# Patient Record
Sex: FEMALE | URBAN - METROPOLITAN AREA
[De-identification: names, ages, dates, MRNs, and addresses within clinical notes are randomized per-mention and may not be internally consistent; named-entity substitution may affect disease eponyms.]

---

## 2017-12-06 PROBLEM — L20.84 INTRINSIC ECZEMA: Status: ACTIVE | Noted: 2017-12-06

## 2018-03-01 PROBLEM — S76.219A GROIN STRAIN: Status: ACTIVE | Noted: 2018-03-01

## 2019-07-29 PROBLEM — I48.91 ATRIAL FIBRILLATION (HCC): Status: ACTIVE | Noted: 2019-07-29

## 2019-07-29 PROBLEM — Z79.01 CHRONIC ANTICOAGULATION: Status: ACTIVE | Noted: 2019-07-29

## 2020-08-19 PROBLEM — F41.9 INSOMNIA SECONDARY TO ANXIETY: Status: ACTIVE | Noted: 2020-08-19

## 2020-08-19 PROBLEM — F51.05 INSOMNIA SECONDARY TO ANXIETY: Status: ACTIVE | Noted: 2020-08-19

## 2020-08-24 ENCOUNTER — APPOINTMENT (RX ONLY)
Dept: URBAN - METROPOLITAN AREA CLINIC 31 | Facility: CLINIC | Age: 80
Setting detail: DERMATOLOGY
End: 2020-08-24

## 2020-08-24 DIAGNOSIS — L90.5 SCAR CONDITIONS AND FIBROSIS OF SKIN: ICD-10-CM

## 2020-08-24 DIAGNOSIS — D18.0 HEMANGIOMA: ICD-10-CM

## 2020-08-24 DIAGNOSIS — L57.0 ACTINIC KERATOSIS: ICD-10-CM

## 2020-08-24 DIAGNOSIS — D22 MELANOCYTIC NEVI: ICD-10-CM

## 2020-08-24 DIAGNOSIS — L82.1 OTHER SEBORRHEIC KERATOSIS: ICD-10-CM

## 2020-08-24 DIAGNOSIS — L81.4 OTHER MELANIN HYPERPIGMENTATION: ICD-10-CM

## 2020-08-24 PROBLEM — D18.01 HEMANGIOMA OF SKIN AND SUBCUTANEOUS TISSUE: Status: ACTIVE | Noted: 2020-08-24

## 2020-08-24 PROBLEM — D22.5 MELANOCYTIC NEVI OF TRUNK: Status: ACTIVE | Noted: 2020-08-24

## 2020-08-24 PROCEDURE — ? COUNSELING

## 2020-08-24 PROCEDURE — 17000 DESTRUCT PREMALG LESION: CPT

## 2020-08-24 PROCEDURE — 99203 OFFICE O/P NEW LOW 30 MIN: CPT | Mod: 25

## 2020-08-24 PROCEDURE — ? LIQUID NITROGEN

## 2020-08-24 PROCEDURE — 17003 DESTRUCT PREMALG LES 2-14: CPT

## 2020-08-24 ASSESSMENT — LOCATION ZONE DERM
LOCATION ZONE: FACE
LOCATION ZONE: EAR
LOCATION ZONE: TRUNK

## 2020-08-24 ASSESSMENT — LOCATION SIMPLE DESCRIPTION DERM
LOCATION SIMPLE: LEFT TEMPLE
LOCATION SIMPLE: LEFT EYEBROW
LOCATION SIMPLE: LEFT EAR
LOCATION SIMPLE: RIGHT EAR
LOCATION SIMPLE: RIGHT UPPER BACK

## 2020-08-24 ASSESSMENT — LOCATION DETAILED DESCRIPTION DERM
LOCATION DETAILED: RIGHT ANTERIOR EARLOBE
LOCATION DETAILED: LEFT MID TEMPLE
LOCATION DETAILED: RIGHT SUPERIOR UPPER BACK
LOCATION DETAILED: RIGHT MID-UPPER BACK
LOCATION DETAILED: RIGHT INFERIOR UPPER BACK
LOCATION DETAILED: LEFT ANTERIOR EARLOBE
LOCATION DETAILED: LEFT LATERAL EYEBROW

## 2020-08-24 NOTE — PROCEDURE: COUNSELING
Detail Level: Generalized
Detail Level: Detailed
Patient Specific Counseling (Will Not Stick From Patient To Patient): The patient is not bothered by this, and prefers no treatment.

## 2021-03-16 PROBLEM — M54.9 UPPER BACK PAIN ON LEFT SIDE: Status: ACTIVE | Noted: 2021-03-16

## 2021-08-30 ENCOUNTER — APPOINTMENT (RX ONLY)
Dept: URBAN - METROPOLITAN AREA CLINIC 31 | Facility: CLINIC | Age: 81
Setting detail: DERMATOLOGY
End: 2021-08-30

## 2021-08-30 VITALS — HEIGHT: 60.25 IN | WEIGHT: 112 LBS

## 2021-08-30 DIAGNOSIS — L21.8 OTHER SEBORRHEIC DERMATITIS: ICD-10-CM | Status: INADEQUATELY CONTROLLED

## 2021-08-30 DIAGNOSIS — L57.0 ACTINIC KERATOSIS: ICD-10-CM

## 2021-08-30 PROCEDURE — 17000 DESTRUCT PREMALG LESION: CPT

## 2021-08-30 PROCEDURE — ? LIQUID NITROGEN

## 2021-08-30 PROCEDURE — ? PRESCRIPTION

## 2021-08-30 PROCEDURE — ? COUNSELING

## 2021-08-30 PROCEDURE — 99214 OFFICE O/P EST MOD 30 MIN: CPT | Mod: 25

## 2021-08-30 PROCEDURE — ? ADDITIONAL NOTES

## 2021-08-30 RX ORDER — KETOCONAZOLE 20 MG/ML
1 SHAMPOO, SUSPENSION TOPICAL TIW
Qty: 1 | Refills: 6 | Status: ERX | COMMUNITY
Start: 2021-08-30

## 2021-08-30 RX ADMIN — KETOCONAZOLE 1: 20 SHAMPOO, SUSPENSION TOPICAL at 00:00

## 2021-08-30 ASSESSMENT — LOCATION SIMPLE DESCRIPTION DERM
LOCATION SIMPLE: LEFT EYEBROW
LOCATION SIMPLE: RIGHT EYEBROW
LOCATION SIMPLE: ANTERIOR SCALP
LOCATION SIMPLE: RIGHT FOREHEAD

## 2021-08-30 ASSESSMENT — LOCATION DETAILED DESCRIPTION DERM
LOCATION DETAILED: MID-FRONTAL SCALP
LOCATION DETAILED: LEFT CENTRAL EYEBROW
LOCATION DETAILED: RIGHT FOREHEAD
LOCATION DETAILED: RIGHT CENTRAL EYEBROW

## 2021-08-30 ASSESSMENT — LOCATION ZONE DERM
LOCATION ZONE: SCALP
LOCATION ZONE: FACE

## 2021-08-30 NOTE — PROCEDURE: LIQUID NITROGEN
Detail Level: Detailed
Show Applicator Variable?: Yes
Consent: The patient's consent was obtained including but not limited to risks of crusting, scabbing, blistering, scarring, darker or lighter pigmentary change, recurrence, incomplete removal and infection.
Duration Of Freeze Thaw-Cycle (Seconds): 0
Post-Care Instructions: I reviewed with the patient in detail post-care instructions. Patient is to wear sunprotection, and avoid picking at any of the treated lesions. Pt may apply Vaseline to crusted or scabbing areas.
Render Post-Care Instructions In Note?: no

## 2021-08-30 NOTE — PROCEDURE: ADDITIONAL NOTES
Additional Notes: Discussed in detail diagnosis and treatments such as LN2. \\nWill treat with LN2 today.  \\nDiscussed treatment and risks with patient.\\nIncludes concern on intake.
Detail Level: Detailed
Render Risk Assessment In Note?: no
Additional Notes: Discussed in detail diagnosis and treatments such as topical steroids or topical anti-fungals. \\nWill prescribe Ketoconazole shampoo and send Rx to pharmacy.  \\nExplained in detail how to apply topical anti-fungal shampoo three times a week. \\nDiscussed treatment and risks with patient.

## 2021-10-05 ENCOUNTER — APPOINTMENT (RX ONLY)
Dept: URBAN - METROPOLITAN AREA CLINIC 31 | Facility: CLINIC | Age: 81
Setting detail: DERMATOLOGY
End: 2021-10-05

## 2021-10-05 DIAGNOSIS — L81.4 OTHER MELANIN HYPERPIGMENTATION: ICD-10-CM

## 2021-10-05 DIAGNOSIS — Z71.89 OTHER SPECIFIED COUNSELING: ICD-10-CM

## 2021-10-05 DIAGNOSIS — D18.0 HEMANGIOMA: ICD-10-CM

## 2021-10-05 DIAGNOSIS — L72.0 EPIDERMAL CYST: ICD-10-CM

## 2021-10-05 DIAGNOSIS — D22 MELANOCYTIC NEVI: ICD-10-CM

## 2021-10-05 DIAGNOSIS — L82.1 OTHER SEBORRHEIC KERATOSIS: ICD-10-CM

## 2021-10-05 DIAGNOSIS — Z00.00 ENCOUNTER FOR GENERAL ADULT MEDICAL EXAMINATION WITHOUT ABNORMAL FINDINGS: ICD-10-CM

## 2021-10-05 DIAGNOSIS — L82.0 INFLAMED SEBORRHEIC KERATOSIS: ICD-10-CM

## 2021-10-05 PROBLEM — Z71.1 PERSON WITH FEARED HEALTH COMPLAINT IN WHOM NO DIAGNOSIS IS MADE: Status: ACTIVE | Noted: 2021-10-05

## 2021-10-05 PROBLEM — D18.01 HEMANGIOMA OF SKIN AND SUBCUTANEOUS TISSUE: Status: ACTIVE | Noted: 2021-10-05

## 2021-10-05 PROBLEM — D22.5 MELANOCYTIC NEVI OF TRUNK: Status: ACTIVE | Noted: 2021-10-05

## 2021-10-05 PROCEDURE — 99213 OFFICE O/P EST LOW 20 MIN: CPT | Mod: 25

## 2021-10-05 PROCEDURE — ? LIQUID NITROGEN

## 2021-10-05 PROCEDURE — ? COUNSELING

## 2021-10-05 PROCEDURE — 17110 DESTRUCTION B9 LES UP TO 14: CPT

## 2021-10-05 ASSESSMENT — LOCATION ZONE DERM
LOCATION ZONE: TRUNK
LOCATION ZONE: FACE
LOCATION ZONE: LEG

## 2021-10-05 ASSESSMENT — LOCATION DETAILED DESCRIPTION DERM
LOCATION DETAILED: LEFT INFERIOR CENTRAL MALAR CHEEK
LOCATION DETAILED: LEFT MEDIAL MALAR CHEEK
LOCATION DETAILED: RIGHT MID-UPPER BACK
LOCATION DETAILED: RIGHT INFERIOR UPPER BACK
LOCATION DETAILED: RIGHT FOREHEAD
LOCATION DETAILED: RIGHT SUPERIOR UPPER BACK
LOCATION DETAILED: LEFT POPLITEAL SKIN

## 2021-10-05 ASSESSMENT — LOCATION SIMPLE DESCRIPTION DERM
LOCATION SIMPLE: LEFT POPLITEAL SKIN
LOCATION SIMPLE: LEFT CHEEK
LOCATION SIMPLE: RIGHT UPPER BACK
LOCATION SIMPLE: RIGHT FOREHEAD

## 2021-10-05 NOTE — PROCEDURE: LIQUID NITROGEN
Medical Necessity Information: It is in your best interest to select a reason for this procedure from the list below. All of these items fulfill various CMS LCD requirements except the new and changing color options.
Add 52 Modifier (Optional): no
Consent: The patient's consent was obtained including but not limited to risks of crusting, scabbing, blistering, scarring, darker or lighter pigmentary change, recurrence, incomplete removal and infection.
Detail Level: Detailed
Show Aperture Variable?: Yes
Medical Necessity Clause: This procedure was medically necessary because the lesions that were treated were:
Post-Care Instructions: I reviewed with the patient in detail post-care instructions. Patient is to wear sunprotection, and avoid picking at any of the treated lesions. Pt may apply Vaseline to crusted or scabbing areas.

## 2022-06-21 ENCOUNTER — APPOINTMENT (RX ONLY)
Dept: URBAN - METROPOLITAN AREA CLINIC 31 | Facility: CLINIC | Age: 82
Setting detail: DERMATOLOGY
End: 2022-06-21

## 2022-06-21 DIAGNOSIS — L82.1 OTHER SEBORRHEIC KERATOSIS: ICD-10-CM

## 2022-06-21 PROCEDURE — 99212 OFFICE O/P EST SF 10 MIN: CPT

## 2022-06-21 PROCEDURE — ? COUNSELING

## 2022-06-21 PROCEDURE — ? ADDITIONAL NOTES

## 2022-06-21 ASSESSMENT — LOCATION ZONE DERM: LOCATION ZONE: FACE

## 2022-06-21 ASSESSMENT — LOCATION DETAILED DESCRIPTION DERM: LOCATION DETAILED: LEFT SUPERIOR LATERAL BUCCAL CHEEK

## 2022-06-21 ASSESSMENT — LOCATION SIMPLE DESCRIPTION DERM: LOCATION SIMPLE: LEFT CHEEK

## 2022-06-21 NOTE — PROCEDURE: ADDITIONAL NOTES
Additional Notes: Includes lesion of concern noted on intake
Detail Level: Zone
Render Risk Assessment In Note?: no

## 2022-10-06 ENCOUNTER — APPOINTMENT (RX ONLY)
Dept: URBAN - METROPOLITAN AREA CLINIC 31 | Facility: CLINIC | Age: 82
Setting detail: DERMATOLOGY
End: 2022-10-06

## 2022-10-06 ENCOUNTER — RX ONLY (OUTPATIENT)
Age: 82
Setting detail: RX ONLY
End: 2022-10-06

## 2022-10-06 DIAGNOSIS — R20.2 PARESTHESIA OF SKIN: ICD-10-CM

## 2022-10-06 DIAGNOSIS — L21.8 OTHER SEBORRHEIC DERMATITIS: ICD-10-CM

## 2022-10-06 DIAGNOSIS — Z71.89 OTHER SPECIFIED COUNSELING: ICD-10-CM

## 2022-10-06 DIAGNOSIS — D22 MELANOCYTIC NEVI: ICD-10-CM

## 2022-10-06 DIAGNOSIS — L81.4 OTHER MELANIN HYPERPIGMENTATION: ICD-10-CM

## 2022-10-06 DIAGNOSIS — L82.1 OTHER SEBORRHEIC KERATOSIS: ICD-10-CM

## 2022-10-06 DIAGNOSIS — D18.0 HEMANGIOMA: ICD-10-CM

## 2022-10-06 PROBLEM — D22.5 MELANOCYTIC NEVI OF TRUNK: Status: ACTIVE | Noted: 2022-10-06

## 2022-10-06 PROBLEM — D18.01 HEMANGIOMA OF SKIN AND SUBCUTANEOUS TISSUE: Status: ACTIVE | Noted: 2022-10-06

## 2022-10-06 PROCEDURE — ? COUNSELING

## 2022-10-06 PROCEDURE — 99213 OFFICE O/P EST LOW 20 MIN: CPT

## 2022-10-06 PROCEDURE — ? PRESCRIPTION

## 2022-10-06 RX ORDER — HYDROCORTISONE VALERATE 2 MG/G
CREAM TOPICAL
Qty: 15 | Refills: 6 | Status: ERX | COMMUNITY
Start: 2022-10-06

## 2022-10-06 RX ORDER — FLUOCINOLONE ACETONIDE 0.25 MG/G
CREAM TOPICAL
Qty: 60 | Refills: 11 | Status: ERX | COMMUNITY
Start: 2022-10-06

## 2022-10-06 RX ADMIN — HYDROCORTISONE VALERATE: 2 CREAM TOPICAL at 00:00

## 2022-10-06 ASSESSMENT — LOCATION SIMPLE DESCRIPTION DERM
LOCATION SIMPLE: RIGHT UPPER BACK
LOCATION SIMPLE: RIGHT EYEBROW
LOCATION SIMPLE: LEFT UPPER BACK
LOCATION SIMPLE: LEFT EYEBROW

## 2022-10-06 ASSESSMENT — LOCATION DETAILED DESCRIPTION DERM
LOCATION DETAILED: RIGHT INFERIOR UPPER BACK
LOCATION DETAILED: RIGHT CENTRAL EYEBROW
LOCATION DETAILED: LEFT SUPERIOR UPPER BACK
LOCATION DETAILED: RIGHT SUPERIOR UPPER BACK
LOCATION DETAILED: LEFT CENTRAL EYEBROW

## 2022-10-06 ASSESSMENT — LOCATION ZONE DERM
LOCATION ZONE: TRUNK
LOCATION ZONE: FACE

## 2023-10-11 ENCOUNTER — APPOINTMENT (RX ONLY)
Dept: URBAN - METROPOLITAN AREA CLINIC 31 | Facility: CLINIC | Age: 83
Setting detail: DERMATOLOGY
End: 2023-10-11

## 2023-10-11 DIAGNOSIS — Z71.89 OTHER SPECIFIED COUNSELING: ICD-10-CM

## 2023-10-11 DIAGNOSIS — D22 MELANOCYTIC NEVI: ICD-10-CM

## 2023-10-11 DIAGNOSIS — L82.1 OTHER SEBORRHEIC KERATOSIS: ICD-10-CM

## 2023-10-11 DIAGNOSIS — R233 SPONTANEOUS ECCHYMOSES: ICD-10-CM

## 2023-10-11 DIAGNOSIS — L21.8 OTHER SEBORRHEIC DERMATITIS: ICD-10-CM

## 2023-10-11 DIAGNOSIS — T07XXXA INSECT BITE, NONVENOMOUS, OF OTHER, MULTIPLE, AND UNSPECIFIED SITES, WITHOUT MENTION OF INFECTION: ICD-10-CM

## 2023-10-11 DIAGNOSIS — L81.4 OTHER MELANIN HYPERPIGMENTATION: ICD-10-CM

## 2023-10-11 DIAGNOSIS — D18.0 HEMANGIOMA: ICD-10-CM

## 2023-10-11 PROBLEM — S90.211A CONTUSION OF RIGHT GREAT TOE WITH DAMAGE TO NAIL, INITIAL ENCOUNTER: Status: ACTIVE | Noted: 2023-10-11

## 2023-10-11 PROBLEM — S00.86XA INSECT BITE (NONVENOMOUS) OF OTHER PART OF HEAD, INITIAL ENCOUNTER: Status: ACTIVE | Noted: 2023-10-11

## 2023-10-11 PROBLEM — D18.01 HEMANGIOMA OF SKIN AND SUBCUTANEOUS TISSUE: Status: ACTIVE | Noted: 2023-10-11

## 2023-10-11 PROBLEM — D22.5 MELANOCYTIC NEVI OF TRUNK: Status: ACTIVE | Noted: 2023-10-11

## 2023-10-11 PROCEDURE — ? COUNSELING

## 2023-10-11 PROCEDURE — ? PRESCRIPTION

## 2023-10-11 PROCEDURE — 99213 OFFICE O/P EST LOW 20 MIN: CPT

## 2023-10-11 RX ORDER — HYDROCORTISONE VALERATE 2 MG/G
CREAM TOPICAL
Qty: 45 | Refills: 6 | Status: ERX | COMMUNITY
Start: 2023-10-11

## 2023-10-11 RX ADMIN — HYDROCORTISONE VALERATE: 2 CREAM TOPICAL at 00:00

## 2023-10-11 ASSESSMENT — LOCATION ZONE DERM
LOCATION ZONE: TOENAIL
LOCATION ZONE: FACE
LOCATION ZONE: TRUNK

## 2023-10-11 ASSESSMENT — LOCATION SIMPLE DESCRIPTION DERM
LOCATION SIMPLE: LEFT FOREHEAD
LOCATION SIMPLE: RIGHT UPPER BACK
LOCATION SIMPLE: RIGHT GREAT TOE

## 2023-10-11 ASSESSMENT — LOCATION DETAILED DESCRIPTION DERM
LOCATION DETAILED: LEFT SUPERIOR LATERAL FOREHEAD
LOCATION DETAILED: RIGHT SUPERIOR UPPER BACK
LOCATION DETAILED: RIGHT INFERIOR UPPER BACK
LOCATION DETAILED: RIGHT GREAT TOENAIL

## 2023-11-01 PROBLEM — I10 ESSENTIAL HYPERTENSION, BENIGN: Status: ACTIVE | Noted: 2023-11-01

## 2024-02-14 ENCOUNTER — HOSPITAL ENCOUNTER (OUTPATIENT)
Facility: MEDICAL CENTER | Age: 84
End: 2024-02-16
Attending: STUDENT IN AN ORGANIZED HEALTH CARE EDUCATION/TRAINING PROGRAM | Admitting: STUDENT IN AN ORGANIZED HEALTH CARE EDUCATION/TRAINING PROGRAM
Payer: MEDICARE

## 2024-02-14 DIAGNOSIS — K64.8 INTERNAL HEMORRHOIDS: ICD-10-CM

## 2024-02-14 PROBLEM — K92.2 GIB (GASTROINTESTINAL BLEEDING): Status: ACTIVE | Noted: 2024-02-14

## 2024-02-14 PROBLEM — I48.0 PAROXYSMAL ATRIAL FIBRILLATION (HCC): Status: ACTIVE | Noted: 2019-07-29

## 2024-02-14 PROBLEM — K62.5 BRIGHT RED BLOOD PER RECTUM: Status: ACTIVE | Noted: 2024-02-14

## 2024-02-14 PROCEDURE — 99223 1ST HOSP IP/OBS HIGH 75: CPT | Performed by: STUDENT IN AN ORGANIZED HEALTH CARE EDUCATION/TRAINING PROGRAM

## 2024-02-14 PROCEDURE — G0378 HOSPITAL OBSERVATION PER HR: HCPCS

## 2024-02-14 RX ORDER — METOPROLOL SUCCINATE 25 MG/1
25 TABLET, EXTENDED RELEASE ORAL 2 TIMES DAILY
Status: DISCONTINUED | OUTPATIENT
Start: 2024-02-14 | End: 2024-02-16 | Stop reason: HOSPADM

## 2024-02-14 RX ORDER — OMEPRAZOLE 20 MG/1
20 CAPSULE, DELAYED RELEASE ORAL DAILY
Status: DISCONTINUED | OUTPATIENT
Start: 2024-02-15 | End: 2024-02-15

## 2024-02-14 RX ORDER — ATORVASTATIN CALCIUM 10 MG/1
10 TABLET, FILM COATED ORAL DAILY
Status: DISCONTINUED | OUTPATIENT
Start: 2024-02-15 | End: 2024-02-16 | Stop reason: HOSPADM

## 2024-02-14 RX ORDER — LABETALOL HYDROCHLORIDE 5 MG/ML
10 INJECTION, SOLUTION INTRAVENOUS EVERY 4 HOURS PRN
Status: DISCONTINUED | OUTPATIENT
Start: 2024-02-14 | End: 2024-02-16 | Stop reason: HOSPADM

## 2024-02-14 RX ORDER — LEVOTHYROXINE SODIUM 0.07 MG/1
75 TABLET ORAL
Status: DISCONTINUED | OUTPATIENT
Start: 2024-02-15 | End: 2024-02-16 | Stop reason: HOSPADM

## 2024-02-14 RX ORDER — SODIUM CHLORIDE 9 MG/ML
INJECTION, SOLUTION INTRAVENOUS CONTINUOUS
Status: DISCONTINUED | OUTPATIENT
Start: 2024-02-14 | End: 2024-02-16 | Stop reason: HOSPADM

## 2024-02-14 RX ORDER — LOSARTAN POTASSIUM 50 MG/1
25 TABLET ORAL 2 TIMES DAILY
Status: DISCONTINUED | OUTPATIENT
Start: 2024-02-14 | End: 2024-02-16 | Stop reason: HOSPADM

## 2024-02-14 RX ORDER — PANTOPRAZOLE SODIUM 20 MG/1
40 TABLET, DELAYED RELEASE ORAL DAILY
Status: DISCONTINUED | OUTPATIENT
Start: 2024-02-15 | End: 2024-02-14

## 2024-02-14 RX ORDER — ACETAMINOPHEN 325 MG/1
650 TABLET ORAL EVERY 6 HOURS PRN
Status: DISCONTINUED | OUTPATIENT
Start: 2024-02-14 | End: 2024-02-16 | Stop reason: HOSPADM

## 2024-02-14 ASSESSMENT — ENCOUNTER SYMPTOMS
COUGH: 0
FEVER: 0
CHILLS: 0
ABDOMINAL PAIN: 0
DIARRHEA: 0
BLOOD IN STOOL: 1
VOMITING: 0
SHORTNESS OF BREATH: 0
NAUSEA: 0

## 2024-02-14 ASSESSMENT — LIFESTYLE VARIABLES
EVER FELT BAD OR GUILTY ABOUT YOUR DRINKING: NO
ON A TYPICAL DAY WHEN YOU DRINK ALCOHOL HOW MANY DRINKS DO YOU HAVE: 0
ALCOHOL_USE: NO
AVERAGE NUMBER OF DAYS PER WEEK YOU HAVE A DRINK CONTAINING ALCOHOL: 0
HAVE PEOPLE ANNOYED YOU BY CRITICIZING YOUR DRINKING: NO
EVER HAD A DRINK FIRST THING IN THE MORNING TO STEADY YOUR NERVES TO GET RID OF A HANGOVER: NO
HAVE YOU EVER FELT YOU SHOULD CUT DOWN ON YOUR DRINKING: NO
DOES PATIENT WANT TO STOP DRINKING: NO
HOW MANY TIMES IN THE PAST YEAR HAVE YOU HAD 5 OR MORE DRINKS IN A DAY: 0
CONSUMPTION TOTAL: NEGATIVE
TOTAL SCORE: 0

## 2024-02-14 ASSESSMENT — FIBROSIS 4 INDEX
FIB4 SCORE: 1.35
FIB4 SCORE: 1.35

## 2024-02-14 ASSESSMENT — PATIENT HEALTH QUESTIONNAIRE - PHQ9
1. LITTLE INTEREST OR PLEASURE IN DOING THINGS: NOT AT ALL
SUM OF ALL RESPONSES TO PHQ9 QUESTIONS 1 AND 2: 0
2. FEELING DOWN, DEPRESSED, IRRITABLE, OR HOPELESS: NOT AT ALL

## 2024-02-15 ENCOUNTER — ANESTHESIA (OUTPATIENT)
Dept: SURGERY | Facility: MEDICAL CENTER | Age: 84
End: 2024-02-15
Payer: MEDICARE

## 2024-02-15 ENCOUNTER — ANESTHESIA EVENT (OUTPATIENT)
Dept: SURGERY | Facility: MEDICAL CENTER | Age: 84
End: 2024-02-15
Payer: MEDICARE

## 2024-02-15 PROBLEM — D68.32 HEMORRHAGIC DISORDER DUE TO EXTRINSIC CIRCULATING ANTICOAGULANTS (HCC): Status: ACTIVE | Noted: 2024-02-15

## 2024-02-15 LAB
ANION GAP SERPL CALC-SCNC: 12 MMOL/L (ref 7–16)
BUN SERPL-MCNC: 14 MG/DL (ref 8–22)
CALCIUM SERPL-MCNC: 8.6 MG/DL (ref 8.5–10.5)
CHLORIDE SERPL-SCNC: 108 MMOL/L (ref 96–112)
CO2 SERPL-SCNC: 22 MMOL/L (ref 20–33)
CREAT SERPL-MCNC: 0.86 MG/DL (ref 0.5–1.4)
ERYTHROCYTE [DISTWIDTH] IN BLOOD BY AUTOMATED COUNT: 41.6 FL (ref 35.9–50)
GFR SERPLBLD CREATININE-BSD FMLA CKD-EPI: 68 ML/MIN/1.73 M 2
GLUCOSE SERPL-MCNC: 95 MG/DL (ref 65–99)
HCT VFR BLD AUTO: 32 % (ref 37–47)
HGB BLD-MCNC: 11.8 G/DL (ref 12–16)
MCH RBC QN AUTO: 33.8 PG (ref 27–33)
MCHC RBC AUTO-ENTMCNC: 36.9 G/DL (ref 32.2–35.5)
MCV RBC AUTO: 91.7 FL (ref 81.4–97.8)
PLATELET # BLD AUTO: 239 K/UL (ref 164–446)
PMV BLD AUTO: 9 FL (ref 9–12.9)
POTASSIUM SERPL-SCNC: 3.5 MMOL/L (ref 3.6–5.5)
RBC # BLD AUTO: 3.49 M/UL (ref 4.2–5.4)
SODIUM SERPL-SCNC: 142 MMOL/L (ref 135–145)
WBC # BLD AUTO: 9.3 K/UL (ref 4.8–10.8)

## 2024-02-15 PROCEDURE — 99233 SBSQ HOSP IP/OBS HIGH 50: CPT | Mod: FS | Performed by: INTERNAL MEDICINE

## 2024-02-15 PROCEDURE — 160202 HCHG ENDO MINUTES - 1ST 30 MINS LEVEL 3: Performed by: INTERNAL MEDICINE

## 2024-02-15 PROCEDURE — 96376 TX/PRO/DX INJ SAME DRUG ADON: CPT | Mod: XU

## 2024-02-15 PROCEDURE — 700102 HCHG RX REV CODE 250 W/ 637 OVERRIDE(OP): Performed by: STUDENT IN AN ORGANIZED HEALTH CARE EDUCATION/TRAINING PROGRAM

## 2024-02-15 PROCEDURE — 700111 HCHG RX REV CODE 636 W/ 250 OVERRIDE (IP): Performed by: INTERNAL MEDICINE

## 2024-02-15 PROCEDURE — G0378 HOSPITAL OBSERVATION PER HR: HCPCS

## 2024-02-15 PROCEDURE — 700101 HCHG RX REV CODE 250: Performed by: INTERNAL MEDICINE

## 2024-02-15 PROCEDURE — 160035 HCHG PACU - 1ST 60 MINS PHASE I: Performed by: INTERNAL MEDICINE

## 2024-02-15 PROCEDURE — 160048 HCHG OR STATISTICAL LEVEL 1-5: Performed by: INTERNAL MEDICINE

## 2024-02-15 PROCEDURE — 160002 HCHG RECOVERY MINUTES (STAT): Performed by: INTERNAL MEDICINE

## 2024-02-15 PROCEDURE — 99214 OFFICE O/P EST MOD 30 MIN: CPT | Mod: 25 | Performed by: INTERNAL MEDICINE

## 2024-02-15 PROCEDURE — 700101 HCHG RX REV CODE 250: Performed by: ANESTHESIOLOGY

## 2024-02-15 PROCEDURE — 96374 THER/PROPH/DIAG INJ IV PUSH: CPT | Mod: XU

## 2024-02-15 PROCEDURE — 700105 HCHG RX REV CODE 258: Performed by: ANESTHESIOLOGY

## 2024-02-15 PROCEDURE — 160009 HCHG ANES TIME/MIN: Performed by: INTERNAL MEDICINE

## 2024-02-15 PROCEDURE — 43235 EGD DIAGNOSTIC BRUSH WASH: CPT | Performed by: INTERNAL MEDICINE

## 2024-02-15 PROCEDURE — 700111 HCHG RX REV CODE 636 W/ 250 OVERRIDE (IP): Mod: JZ | Performed by: ANESTHESIOLOGY

## 2024-02-15 PROCEDURE — 80048 BASIC METABOLIC PNL TOTAL CA: CPT

## 2024-02-15 PROCEDURE — C9113 INJ PANTOPRAZOLE SODIUM, VIA: HCPCS | Performed by: INTERNAL MEDICINE

## 2024-02-15 PROCEDURE — A9270 NON-COVERED ITEM OR SERVICE: HCPCS | Performed by: STUDENT IN AN ORGANIZED HEALTH CARE EDUCATION/TRAINING PROGRAM

## 2024-02-15 PROCEDURE — 700105 HCHG RX REV CODE 258: Performed by: STUDENT IN AN ORGANIZED HEALTH CARE EDUCATION/TRAINING PROGRAM

## 2024-02-15 PROCEDURE — 85027 COMPLETE CBC AUTOMATED: CPT

## 2024-02-15 RX ORDER — LABETALOL HYDROCHLORIDE 5 MG/ML
5 INJECTION, SOLUTION INTRAVENOUS
Status: DISCONTINUED | OUTPATIENT
Start: 2024-02-15 | End: 2024-02-15 | Stop reason: HOSPADM

## 2024-02-15 RX ORDER — SODIUM CHLORIDE, SODIUM LACTATE, POTASSIUM CHLORIDE, CALCIUM CHLORIDE 600; 310; 30; 20 MG/100ML; MG/100ML; MG/100ML; MG/100ML
INJECTION, SOLUTION INTRAVENOUS CONTINUOUS
Status: DISCONTINUED | OUTPATIENT
Start: 2024-02-15 | End: 2024-02-16 | Stop reason: HOSPADM

## 2024-02-15 RX ORDER — MEPERIDINE HYDROCHLORIDE 25 MG/ML
12.5 INJECTION INTRAMUSCULAR; INTRAVENOUS; SUBCUTANEOUS
Status: DISCONTINUED | OUTPATIENT
Start: 2024-02-15 | End: 2024-02-15 | Stop reason: HOSPADM

## 2024-02-15 RX ORDER — OXYCODONE HCL 5 MG/5 ML
10 SOLUTION, ORAL ORAL
Status: DISCONTINUED | OUTPATIENT
Start: 2024-02-15 | End: 2024-02-15 | Stop reason: HOSPADM

## 2024-02-15 RX ORDER — METOPROLOL TARTRATE 1 MG/ML
1 INJECTION, SOLUTION INTRAVENOUS
Status: DISCONTINUED | OUTPATIENT
Start: 2024-02-15 | End: 2024-02-15 | Stop reason: HOSPADM

## 2024-02-15 RX ORDER — HALOPERIDOL 5 MG/ML
1 INJECTION INTRAMUSCULAR
Status: DISCONTINUED | OUTPATIENT
Start: 2024-02-15 | End: 2024-02-15 | Stop reason: HOSPADM

## 2024-02-15 RX ORDER — IPRATROPIUM BROMIDE AND ALBUTEROL SULFATE 2.5; .5 MG/3ML; MG/3ML
3 SOLUTION RESPIRATORY (INHALATION)
Status: DISCONTINUED | OUTPATIENT
Start: 2024-02-15 | End: 2024-02-15 | Stop reason: HOSPADM

## 2024-02-15 RX ORDER — OXYCODONE HCL 5 MG/5 ML
5 SOLUTION, ORAL ORAL
Status: DISCONTINUED | OUTPATIENT
Start: 2024-02-15 | End: 2024-02-15 | Stop reason: HOSPADM

## 2024-02-15 RX ORDER — ONDANSETRON 2 MG/ML
4 INJECTION INTRAMUSCULAR; INTRAVENOUS
Status: DISCONTINUED | OUTPATIENT
Start: 2024-02-15 | End: 2024-02-15 | Stop reason: HOSPADM

## 2024-02-15 RX ORDER — LIDOCAINE HYDROCHLORIDE 20 MG/ML
INJECTION, SOLUTION EPIDURAL; INFILTRATION; INTRACAUDAL; PERINEURAL PRN
Status: DISCONTINUED | OUTPATIENT
Start: 2024-02-15 | End: 2024-02-15 | Stop reason: SURG

## 2024-02-15 RX ORDER — DIPHENHYDRAMINE HYDROCHLORIDE 50 MG/ML
12.5 INJECTION INTRAMUSCULAR; INTRAVENOUS
Status: DISCONTINUED | OUTPATIENT
Start: 2024-02-15 | End: 2024-02-15 | Stop reason: HOSPADM

## 2024-02-15 RX ORDER — SODIUM CHLORIDE, SODIUM LACTATE, POTASSIUM CHLORIDE, CALCIUM CHLORIDE 600; 310; 30; 20 MG/100ML; MG/100ML; MG/100ML; MG/100ML
INJECTION, SOLUTION INTRAVENOUS
Status: DISCONTINUED | OUTPATIENT
Start: 2024-02-15 | End: 2024-02-15 | Stop reason: SURG

## 2024-02-15 RX ORDER — PANTOPRAZOLE SODIUM 40 MG/10ML
40 INJECTION, POWDER, LYOPHILIZED, FOR SOLUTION INTRAVENOUS 2 TIMES DAILY
Status: DISCONTINUED | OUTPATIENT
Start: 2024-02-15 | End: 2024-02-16 | Stop reason: HOSPADM

## 2024-02-15 RX ORDER — HYDRALAZINE HYDROCHLORIDE 20 MG/ML
5 INJECTION INTRAMUSCULAR; INTRAVENOUS
Status: DISCONTINUED | OUTPATIENT
Start: 2024-02-15 | End: 2024-02-15 | Stop reason: HOSPADM

## 2024-02-15 RX ORDER — ONDANSETRON 2 MG/ML
INJECTION INTRAMUSCULAR; INTRAVENOUS PRN
Status: DISCONTINUED | OUTPATIENT
Start: 2024-02-15 | End: 2024-02-15 | Stop reason: SURG

## 2024-02-15 RX ORDER — EPHEDRINE SULFATE 50 MG/ML
5 INJECTION, SOLUTION INTRAVENOUS
Status: DISCONTINUED | OUTPATIENT
Start: 2024-02-15 | End: 2024-02-15 | Stop reason: HOSPADM

## 2024-02-15 RX ADMIN — LEVOTHYROXINE SODIUM 75 MCG: 0.07 TABLET ORAL at 05:30

## 2024-02-15 RX ADMIN — ATORVASTATIN CALCIUM 10 MG: 10 TABLET, FILM COATED ORAL at 00:52

## 2024-02-15 RX ADMIN — LOSARTAN POTASSIUM 25 MG: 50 TABLET, FILM COATED ORAL at 00:52

## 2024-02-15 RX ADMIN — SODIUM CHLORIDE, POTASSIUM CHLORIDE, SODIUM LACTATE AND CALCIUM CHLORIDE: 600; 310; 30; 20 INJECTION, SOLUTION INTRAVENOUS at 18:11

## 2024-02-15 RX ADMIN — ONDANSETRON 4 MG: 2 INJECTION INTRAMUSCULAR; INTRAVENOUS at 16:05

## 2024-02-15 RX ADMIN — OMEPRAZOLE 20 MG: 20 CAPSULE, DELAYED RELEASE ORAL at 05:30

## 2024-02-15 RX ADMIN — SODIUM CHLORIDE: 9 INJECTION, SOLUTION INTRAVENOUS at 00:54

## 2024-02-15 RX ADMIN — POLYETHYLENE GLYCOL-3350 AND ELECTROLYTES 4 L: 236; 6.74; 5.86; 2.97; 22.74 POWDER, FOR SOLUTION ORAL at 18:25

## 2024-02-15 RX ADMIN — PANTOPRAZOLE SODIUM 40 MG: 40 INJECTION, POWDER, FOR SOLUTION INTRAVENOUS at 07:33

## 2024-02-15 RX ADMIN — ATORVASTATIN CALCIUM 10 MG: 10 TABLET, FILM COATED ORAL at 20:00

## 2024-02-15 RX ADMIN — METOPROLOL SUCCINATE 25 MG: 25 TABLET, EXTENDED RELEASE ORAL at 00:52

## 2024-02-15 RX ADMIN — METOPROLOL SUCCINATE 25 MG: 25 TABLET, EXTENDED RELEASE ORAL at 18:04

## 2024-02-15 RX ADMIN — LOSARTAN POTASSIUM 25 MG: 50 TABLET, FILM COATED ORAL at 18:04

## 2024-02-15 RX ADMIN — LIDOCAINE HYDROCHLORIDE 50 MG: 20 INJECTION, SOLUTION EPIDURAL; INFILTRATION; INTRACAUDAL at 16:01

## 2024-02-15 RX ADMIN — PROPOFOL 100 MG: 10 INJECTION, EMULSION INTRAVENOUS at 16:01

## 2024-02-15 RX ADMIN — PANTOPRAZOLE SODIUM 40 MG: 40 INJECTION, POWDER, FOR SOLUTION INTRAVENOUS at 18:05

## 2024-02-15 RX ADMIN — SODIUM CHLORIDE, POTASSIUM CHLORIDE, SODIUM LACTATE AND CALCIUM CHLORIDE: 600; 310; 30; 20 INJECTION, SOLUTION INTRAVENOUS at 15:57

## 2024-02-15 RX ADMIN — LOSARTAN POTASSIUM 25 MG: 50 TABLET, FILM COATED ORAL at 05:31

## 2024-02-15 RX ADMIN — FENTANYL CITRATE 100 MCG: 50 INJECTION, SOLUTION INTRAMUSCULAR; INTRAVENOUS at 15:59

## 2024-02-15 ASSESSMENT — ENCOUNTER SYMPTOMS
PSYCHIATRIC NEGATIVE: 1
CHILLS: 0
WEAKNESS: 1
RESPIRATORY NEGATIVE: 1
MUSCULOSKELETAL NEGATIVE: 1
BLOOD IN STOOL: 1
CARDIOVASCULAR NEGATIVE: 1
EYES NEGATIVE: 1
FEVER: 0

## 2024-02-15 ASSESSMENT — PAIN DESCRIPTION - PAIN TYPE
TYPE: SURGICAL PAIN
TYPE: ACUTE PAIN

## 2024-02-15 ASSESSMENT — PATIENT HEALTH QUESTIONNAIRE - PHQ9
1. LITTLE INTEREST OR PLEASURE IN DOING THINGS: NOT AT ALL
2. FEELING DOWN, DEPRESSED, IRRITABLE, OR HOPELESS: NOT AT ALL
SUM OF ALL RESPONSES TO PHQ9 QUESTIONS 1 AND 2: 0

## 2024-02-15 ASSESSMENT — PAIN SCALES - GENERAL: PAIN_LEVEL: 2

## 2024-02-15 NOTE — HOSPITAL COURSE
Ms. Sabrina Peters is a 83 y.o. female with PMHx of atrial fibrillation on anticoagulation, hypertension, hypothyroidism, GERD, has had dark stools/black for the past 2 weeks, no tarry characteristics.  [place] [facility] [place].      Since yesterday she has been having red-colored stools so she went to the ED.  She is on rivaroxaban for atrial fibrillation.  She has a history GERD and takes PPI, says she still has really bad heartburn despite that.  She had an EGD done 6 or more years ago and was told was negative, last colonoscopy was 11/2022 and was told was normal.  She has not been taking any NSAIDs.  Says she drinks 2 ounces of alcohol on the weekend, however last week she drank a lot more than usual.     Prior to transfer vitals were stable, on arrival here hemodynamically stable.  Labs show hemoglobin of 13.  Patient admitted to hospital medicine for management of care.    During this admission, gastroenterology Dr. Luke was consulted.  Plan for EGD today 2/15, and possible colonoscopy depending on the findings on the upper endoscopy.

## 2024-02-15 NOTE — PROGRESS NOTES
Med rec is complete per Patient at bedside.  Family/friend/caregiver present at time of interview with permission from Patient.    Allergies reviewed.    Has patient had any outside antibiotics in the last 30 days? N    Any Anticoagulants (rivaroxaban, apixaban, edoxaban, dabigatran, warfarin, enoxaparin) taken in the last 14 days? Y  Anticoagulant name: Xarelto, Last dose: 02/13/24 @ 20:00 pm.        Pharmacy/Pharmacies Pt utilizes : [phone]

## 2024-02-15 NOTE — PROGRESS NOTES
Report received from night shift RN. Patient A&Ox4, in bed resting comfortably. VSS, respirations even and unlabored on room air, denies pain, bed in lowest position and locked, call light in reach.

## 2024-02-15 NOTE — CARE PLAN
The patient is Stable - Low risk of patient condition declining or worsening    Shift Goals  Clinical Goals: NPO for GI consult  Patient Goals: rest      Problem: Knowledge Deficit - Standard  Goal: Patient and family/care givers will demonstrate understanding of plan of care, disease process/condition, diagnostic tests and medications  2/15/2024 0255 by Bello Lepe R.N.  Outcome: Progressing  2/15/2024 0255 by Bello Lepe R.N.  Outcome: Progressing       POC reviewed with pt, opportunity for questions provided. NPO for GI consult in the morning.

## 2024-02-15 NOTE — CARE PLAN
The patient is Stable - Low risk of patient condition declining or worsening    Shift Goals  Clinical Goals: GI consult, possible EGD  Patient Goals: Rest  Family Goals: N/A    Progress made toward(s) clinical / shift goals:    Problem: Knowledge Deficit - Standard  Goal: Patient and family/care givers will demonstrate understanding of plan of care, disease process/condition, diagnostic tests and medications  Outcome: Progressing     Problem: Pain - Standard  Goal: Alleviation of pain or a reduction in pain to the patient’s comfort goal  Outcome: Progressing       Patient is not progressing towards the following goals:

## 2024-02-15 NOTE — ANESTHESIA PREPROCEDURE EVALUATION
Case: [id number] Date/Time: 02/15/24 1531    Procedure: GASTROSCOPY (Esophagus)    Anesthesia type: MAC    Pre-op diagnosis: Anemia due to GI blood loss    Location: [facility] ROOM 26 / SURGERY SAME DAY [facility]    Surgeons: Rasheed Luke M.D.            Relevant Problems   CARDIAC   (positive) Essential hypertension, benign   (positive) Paroxysmal atrial fibrillation (HCC)      GI   (positive) Gastroesophageal reflux disease without esophagitis      ENDO   (positive) Acquired hypothyroidism       Physical Exam    Airway   Mallampati: II  TM distance: >3 FB  Neck ROM: full       Cardiovascular - normal exam  Rhythm: regular  Rate: normal  (-) murmur     Dental - normal exam           Pulmonary - normal exam  Breath sounds clear to auscultation     Abdominal    Neurological - normal exam                   Anesthesia Plan    ASA 2       Plan - general       Airway plan will be mask          Induction: intravenous    Postoperative Plan: Postoperative administration of opioids is intended.    Pertinent diagnostic labs and testing reviewed    Informed Consent:    Anesthetic plan and risks discussed with patient.    Use of blood products discussed with: patient whom consented to blood products.

## 2024-02-15 NOTE — H&P
Hospital Medicine History & Physical Note    Date of Service  2/14/2024    Primary Care Physician  Karena Izaguirre D.N.P.    Code Status  Full Code    Chief Complaint  Rectal bleed      History of Presenting Illness  Sabrina Peters is a 83 y.o. female who was [place] [facility] [place] with rectal bleeding.  PMH of atrial fibrillation on anticoagulation, hypertension, hypothyroidism, GERD.  She has had dark stools/black for the past 2 weeks, no tarry characteristics.  Since yesterday she has been having red-colored stools so she went to the ED.  She is on rivaroxaban for atrial fibrillation.  She has a history GERD and takes PPI, says she still has really bad heartburn despite that.  She had an EGD done 6 or more years ago and was told was negative, last colonoscopy was 11/2022 and was told was normal.  She has not been taking any NSAIDs.  Says she drinks 2 ounces of alcohol on the weekend, however last week she drank a lot more than usual.    Prior to transfer vitals were stable, on arrival here hemodynamically stable.  Labs show hemoglobin of 13.    I discussed the plan of care with patient and bedside RN.    Review of Systems  Review of Systems   Constitutional:  Negative for chills and fever.   Respiratory:  Negative for cough and shortness of breath.    Cardiovascular:  Negative for chest pain.   Gastrointestinal:  Positive for blood in stool and melena. Negative for abdominal pain, diarrhea, nausea and vomiting.   Genitourinary:  Negative for dysuria and urgency.       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    Surgical History   has a past surgical history that includes endoscopy procedure (7/5/2016); lumpectomy (Left, 2010); lumpectomy (Left, 2016); tubal coagulation laparoscopic bilateral (1974); abdominal hysterectomy total (1989); and pr colonoscopy,diagnostic (N/A, 12/1/2022).     Family History  family history includes Heart Failure in her mother; Psychiatric Illness in her father; Scoliosis (age of onset: 90) in her mother.   Family history reviewed with patient. There is no family history that is pertinent to the chief complaint.     Social History   reports that she has never smoked. She has never used smokeless tobacco. She reports current alcohol use of about 3.0 oz of alcohol per week. She reports that she does not use drugs.    Allergies  Allergies   Allergen Reactions    Codeine Nausea       Medications  Prior to Admission Medications   Prescriptions Last Dose Informant Patient Reported? Taking?   Ascorbic Acid (VITAMIN C) 1000 MG Tab   Yes No   Sig: Take  by mouth.   Calcium-Vitamin D-Vitamin K (VIACTIV PO)   Yes No   Sig: Take  by mouth.   Multiple Vitamins-Minerals (WOMENS 50+ MULTI VITAMIN/MIN PO)   Yes No   Sig: Take  by mouth.   Omega-3 Fatty Acids (FISH OIL) 1200 MG CAPSULE DELAYED RELEASE  Patient Yes No   Sig: Take 1,200 mg by mouth every day.   acyclovir (ZOVIRAX) 5 % Ointment   No No   Sig: Please give a 90 day supply Apply q 2-4 hours while awake   alclomethasone (ACLOVATE) 0.05 % cream   No No   Sig: APPLY A THIN LAYER TO VAGINAL AREA AS NEEDED FOR ITRRITATION   alendronate (FOSAMAX) 70 MG Tab 2/11/2024  No No   Sig: TAKE 1 TABLET BY MOUTH ONE TIME PER WEEK   atorvastatin (LIPITOR) 10 MG Tab   No No   Sig: TAKE 1/2 TABLET BY MOUTH EVERY DAY   fluticasone (FLONASE) 50 MCG/ACT nasal spray   No No   Sig: INSTILL 1 SPRAY INTO AFFECTED NOSTRIL(S) EVERY DAY.   levothyroxine  (SYNTHROID) 75 MCG Tab   No No   Sig: Take one tab daily except Sunday   losartan (COZAAR) 25 MG Tab   No No   Sig: Take 1 Tablet by mouth 2 times a day.   metoprolol SR (TOPROL XL) 25 MG TABLET SR 24 HR   No No   Sig: TAKE 1 TABLET BY MOUTH TWICE A DAY   nystatin (MYCOSTATIN) 062170 UNIT/GM Ointment   No No   Sig: Apply bid to affected area   pantoprazole (PROTONIX) 20 MG tablet   No No   Sig: Take 1 Tablet by mouth every day.   rivaroxaban (XARELTO) 20 MG Tab tablet   No No   Sig: Take 1 Tablet by mouth with dinner.   valACYclovir (VALTREX) 500 MG Tab   No No   Sig: TAKE 1 TABLET BY MOUTH TWICE A DAY FOR 3 DAYS FOR RECURRENCE      Facility-Administered Medications: None       Physical Exam  Temp:  [36.4 °C (97.6 °F)-36.6 °C (97.8 °F)] 36.4 °C (97.6 °F)  Pulse:  [60-75] 67  Resp:  [13-18] 13  BP: (140-164)/(63-84) 164/72  SpO2:  [93 %-98 %] 94 %  Blood Pressure : (!) 164/72   Temperature: 36.4 °C (97.6 °F)   Pulse: 67   Respiration: 13   Pulse Oximetry: 94 %       Physical Exam  Constitutional:       Appearance: Normal appearance.   HENT:      Head: Normocephalic and atraumatic.      Mouth/Throat:      Mouth: Mucous membranes are moist.      Pharynx: No oropharyngeal exudate or posterior oropharyngeal erythema.   Cardiovascular:      Rate and Rhythm: Normal rate and regular rhythm.      Pulses: Normal pulses.      Heart sounds: Normal heart sounds. No murmur heard.  Pulmonary:      Effort: Pulmonary effort is normal. No respiratory distress.      Breath sounds: Normal breath sounds.   Musculoskeletal:         General: No swelling or tenderness. Normal range of motion.   Skin:     General: Skin is warm and dry.   Neurological:      General: No focal deficit present.      Mental Status: She is alert and oriented to person, place, and time.   Psychiatric:         Mood and Affect: Mood normal.         Laboratory:  Recent Labs     02/14/24  1813   WBC 10.0   RBC 4.13*   HEMOGLOBIN 13.4   HEMATOCRIT 38.9*   MCV 94.2  "  MCH 32.4*   MCHC 34.4   RDW 12.4   PLATELETCT 297   MPV 8.5     Recent Labs     02/14/24  1813   SODIUM 142   POTASSIUM 3.3*   CHLORIDE 105   CO2 28   GLUCOSE 83   BUN 16   CREATININE 1.0   CALCIUM 9.4     Recent Labs     02/14/24  1813   ALTSGPT 32   ASTSGOT 28   ALKPHOSPHAT 72   TBILIRUBIN 0.3   GLUCOSE 83     Recent Labs     02/14/24  1813   APTT 26.5   INR 0.99     No results for input(s): \"NTPROBNP\" in the last 72 hours.      No results for input(s): \"TROPONINT\" in the last 72 hours.    Imaging:  No orders to display     Assessment/Plan:  Justification for Admission Status  I anticipate this patient is appropriate for observation status at this time because rectal bleed    * Bright red blood per rectum- (present on admission)  Assessment & Plan  She has had dark/black stools for the past 2 weeks, since today red stools  On rivaroxaban for atrial fibrillation  Has not been taking any NSAIDs.  She has a history of GERD and is already on pantoprazole but says she still has bad symptoms despite that  She drinks 2 ounces of alcohol on the weekend but drank a lot last weekend  Hemodynamically stable, hemoglobin 13 on presentation.  CBC ordered to continue monitoring  N.p.o., GI consult in the morning    Paroxysmal atrial fibrillation (HCC)- (present on admission)  Assessment & Plan  Continue metoprolol.  Hold rivaroxaban due to GI bleed    Essential hypertension, benign- (present on admission)  Assessment & Plan  Continue losartan and metoprolol    Gastroesophageal reflux disease without esophagitis- (present on admission)  Assessment & Plan  Says she was scoped 6 years ago or maybe more and was fine.  Continue pantoprazole    Acquired hypothyroidism- (present on admission)  Assessment & Plan  Continue levothyroxine        VTE prophylaxis: pharmacologic prophylaxis contraindicated due to rectal bleed  "

## 2024-02-15 NOTE — ASSESSMENT & PLAN NOTE
Continue levothyroxine  
Continue losartan and metoprolol  
Continue metoprolol.  Hold rivaroxaban due to GI bleed  
Says she was scoped 6 years ago or maybe more and was fine.  Continue pantoprazole  
She has had dark/black stools for the past 2 weeks, since today red stools  On rivaroxaban for atrial fibrillation  Has not been taking any NSAIDs.  She has a history of GERD and is already on pantoprazole but says she still has bad symptoms despite that  She drinks 2 ounces of alcohol on the weekend but drank a lot last weekend  Hemodynamically stable, hemoglobin 13 on presentation.  CBC ordered to continue monitoring  N.p.o., GI consulted, Dr. Luke; planned EGD and possible colonoscopy  
Never smoker

## 2024-02-15 NOTE — CONSULTS
Date of Consultation:  2/15/2024    Patient: : Sabrina Peters  MRN: [id number]    Referring Physician:  Ms Oswaldo Cisneros     GI:Rasheed Luke M.D.     Reason for Consultation: Anemia due to GI blood loss    History of Present Illness:   83-year-old female who has been having reflux symptomatology and dark stool over the past several weeks.  She noticed some more juan m blood in her stool.  She presented to [facility] at the recommendation of her primary care provider.  She was transferred to [facility] for further evaluation.  Patient has never had upper endoscopy but has had an upper GI series.She is her last colonoscopy was about 1 year ago and it was normal.  Patient takes direct oral anticoagulant.  This has been on hold.      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) 2/15/2024    Essential hypertension, benign 11/1/2023    At risk for sleep apnea 11/18/2022    STOP BANG 4    Dental disorder 11/2022    DENTAL IMPLANT POST IN PLACE    Arrhythmia 2021    A FIB - ABLATION    Insomnia secondary to anxiety 08/19/2020    Groin strain 03/01/2018    Gastroesophageal reflux disease without esophagitis 05/06/2016    Allergy, unspecified not elsewhere classified     Anginal syndrome (HCC)     OCCASIONAL PAINS - PER MD, DUE TO HTN    Asthma     Diverticula of colon     Heart burn     High cholesterol     History of shingles     Hyperlipidemia     Hypertension     OSTEOPOROSIS     Recurrent UTI     Rosacea     Snoring     Thyroid disease        Past Surgical History:   Procedure Laterality Date    GA COLONOSCOPY,DIAGNOSTIC N/A 12/1/2022    Procedure: COLONOSCOPY;  Surgeon: Taina Sanford M.D.;  Location: SURGERY [facility];  Service: General    ENDOSCOPY PROCEDURE  7/5/2016    Procedure: ENDOSCOPY PROCEDURE;  Surgeon: Clarence Phillips M.D.;  Location: SURGERY [facility];  Service:     LUMPECTOMY Left 2016    LUMPECTOMY Left  2010    ABDOMINAL HYSTERECTOMY TOTAL  1989    TUBAL COAGULATION LAPAROSCOPIC BILATERAL  1974       Family History   Problem Relation Age of Onset    Scoliosis Mother 90        hip fx    Heart Failure Mother     Psychiatric Illness Father         alzhiemers       Social History     Socioeconomic History    Marital status:    Tobacco Use    Smoking status: Never    Smokeless tobacco: Never   Vaping Use    Vaping Use: Never used   Substance and Sexual Activity    Alcohol use: Yes     Alcohol/week: 3.0 oz     Types: 2 Glasses of wine, 3 Standard drinks or equivalent per week     Comment: OCCASIONALLY    Drug use: No    Sexual activity: Yes     Partners: Male     Birth control/protection: Post-Menopausal   Other Topics Concern     Service No    Blood Transfusions No    Caffeine Concern Yes    Occupational Exposure No    Hobby Hazards Yes    Sleep Concern Yes    Stress Concern No    Weight Concern No    Special Diet No    Back Care No    Exercise Yes    Bike Helmet No    Seat Belt Yes    Self-Exams Yes   Social History Narrative    Retired. . YEars of education 14. No children.     Social Determinants of Health     Financial Resource Strain: Patient Declined (4/24/2023)    Overall Financial Resource Strain (CARDIA)     Difficulty of Paying Living Expenses: Patient declined   Food Insecurity: Patient Declined (4/24/2023)    Hunger Vital Sign     Worried About Running Out of Food in the Last Year: Patient declined     Ran Out of Food in the Last Year: Patient declined   Transportation Needs: Patient Declined (4/24/2023)    PRAPARE - Transportation     Lack of Transportation (Medical): Patient declined     Lack of Transportation (Non-Medical): Patient declined   Physical Activity: Patient Declined (4/24/2023)    Exercise Vital Sign     Days of Exercise per Week: Patient declined     Minutes of Exercise per Session: Patient declined   Stress: Patient Declined (4/24/2023)    [facility]  Occupational Health - Occupational Stress Questionnaire     Feeling of Stress : Patient declined   Social Connections: Unknown (4/24/2023)    Social Connection and Isolation Panel [NHANES]     Frequency of Communication with Friends and Family: Patient declined     Frequency of Social Gatherings with Friends and Family: Patient declined     Attends Sikhism Services: Patient declined     Attends Club or Organization Meetings: Patient declined     Marital Status: Patient declined   Housing Stability: Unknown (4/24/2023)    Housing Stability Vital Sign     Unable to Pay for Housing in the Last Year: Patient refused     Unstable Housing in the Last Year: Patient refused       Current Meds (name, sig, last dose):     Current Facility-Administered Medications:     pantoprazole    NS    acetaminophen    labetalol    atorvastatin    levothyroxine    losartan    metoprolol SR      ROS  10 systems reviewed and are negative unless otherwise noted in history of present illness.    Physical Exam:  Vitals:    02/15/24 0052 02/15/24 0415 02/15/24 0529 02/15/24 0705   BP: (!) 147/66 124/59  134/62   Pulse: (!) 59 65 (!) 54    Resp:  13  14   Temp:  36.3 °C (97.3 °F)  36.7 °C (98.1 °F)   TempSrc:    Temporal   SpO2: 96% 97%     Weight:       Height:           Physical Exam  General: Non-toxic appearing, without acute distress.  HEENT: Anicteric sclera, OP with moist mucous membranes.  Neck: trachea midline, supple.  Lungs: Clear bilaterally. No labored breathing.  Heart: S1S2 Regular rate. Pulses normal.  Abd: Soft, non-tender, positive bowel sounds.   Ext: without cubbing or cyanosis or edema.  Neurologic: without focal deficit. Moves all extremities.  Psychiatric: normal speech, mood, and affect.      Labs:  Recent Labs     02/14/24  1813 02/15/24  0152   SODIUM 142 142   POTASSIUM 3.3* 3.5*   CHLORIDE 105 108   CO2 28 22   BUN 16 14   CREATININE 1.0 0.86   CALCIUM 9.4 8.6     Recent Labs     02/14/24  1813 02/15/24  0152    ALTSGPT 32  --    ASTSGOT 28  --    ALKPHOSPHAT 72  --    TBILIRUBIN 0.3  --    GLUCOSE 83 95     Recent Labs     02/14/24  1813 02/15/24  0152   WBC 10.0 9.3   ASTSGOT 28  --    ALTSGPT 32  --    ALKPHOSPHAT 72  --    TBILIRUBIN 0.3  --      Recent Labs     02/14/24  1813 02/15/24  0152   RBC 4.13* 3.49*   HEMOGLOBIN 13.4 11.8*   HEMATOCRIT 38.9* 32.0*   PLATELETCT 297 239   PROTHROMBTM 10.3  --    APTT 26.5  --    INR 0.99  --      Recent Results (from the past 24 hour(s))   CBC WITHOUT DIFFERENTIAL    Collection Time: 02/14/24  6:13 PM   Result Value Ref Range    WBC 10.0 4.8 - 10.8 K/uL    RBC 4.13 (L) 4.20 - 5.40 M/uL    Hemoglobin 13.4 13.0 - 17.0 g/dL    Hematocrit 38.9 (L) 39.0 - 50.0 %    MCV 94.2 81.0 - 99.0 fL    MCH 32.4 (H) 27.0 - 31.0 pg    MCHC 34.4 33.0 - 37.0 g/dL    RDW 12.4 11.5 - 14.5 %    Platelet Count 297 130 - 400 K/uL    MPV 8.5 7.4 - 10.4 fL   COMP METABOLIC PANEL    Collection Time: 02/14/24  6:13 PM   Result Value Ref Range    Sodium 142 136 - 145 mmol/L    Potassium 3.3 (L) 3.5 - 5.1 mmol/L    Chloride 105 98 - 107 mmol/L    Co2 28 21 - 32 mmol/L    Anion Gap 12 10 - 18 mmol/L    Glucose 83 74 - 99 mg/dL    Bun 16 7 - 18 mg/dL    Creatinine 1.0 0.6 - 1.0 mg/dL    Calcium 9.4 8.5 - 11.0 mg/dL    AST(SGOT) 28 15 - 37 U/L    ALT(SGPT) 32 12 - 78 U/L    Alkaline Phosphatase 72 46 - 116 U/L    Total Bilirubin 0.3 0.2 - 1.0 mg/dL    Albumin 3.9 3.4 - 5.0 g/dL    Total Protein 7.3 6.4 - 8.2 g/dL    A-G Ratio 1.1    PROTHROMBIN TIME    Collection Time: 02/14/24  6:13 PM   Result Value Ref Range    PT 10.3 9.3 - 11.7 sec    INR 0.99    APTT    Collection Time: 02/14/24  6:13 PM   Result Value Ref Range    APTT 26.5 22.8 - 31.5 sec   ESTIMATED GFR    Collection Time: 02/14/24  6:13 PM   Result Value Ref Range    GFR (CKD-EPI) 56 (A) >60 mL/min/1.73 m 2   Basic Metabolic Panel (BMP)    Collection Time: 02/15/24  1:52 AM   Result Value Ref Range    Sodium 142 135 - 145 mmol/L    Potassium 3.5 (L)  3.6 - 5.5 mmol/L    Chloride 108 96 - 112 mmol/L    Co2 22 20 - 33 mmol/L    Glucose 95 65 - 99 mg/dL    Bun 14 8 - 22 mg/dL    Creatinine 0.86 0.50 - 1.40 mg/dL    Calcium 8.6 8.5 - 10.5 mg/dL    Anion Gap 12.0 7.0 - 16.0   CBC without Differential    Collection Time: 02/15/24  1:52 AM   Result Value Ref Range    WBC 9.3 4.8 - 10.8 K/uL    RBC 3.49 (L) 4.20 - 5.40 M/uL    Hemoglobin 11.8 (L) 12.0 - 16.0 g/dL    Hematocrit 32.0 (L) 37.0 - 47.0 %    MCV 91.7 81.4 - 97.8 fL    MCH 33.8 (H) 27.0 - 33.0 pg    MCHC 36.9 (H) 32.2 - 35.5 g/dL    RDW 41.6 35.9 - 50.0 fL    Platelet Count 239 164 - 446 K/uL    MPV 9.0 9.0 - 12.9 fL   ESTIMATED GFR    Collection Time: 02/15/24  1:52 AM   Result Value Ref Range    GFR (CKD-EPI) 68 >60 mL/min/1.73 m 2         MDM Data Review:  -Records reviewed and summarized in current documentation  -I personally reviewed and interpreted the laboratory results  -I personally reviewed the radiology images  -I have personally reviewed medications    Hospital Problem List:  Active Hospital Problems    Diagnosis     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  Patient's hemoglobin has fallen.  We discussed upper endoscopy.  If upper endoscopy is nonrevealing we would recommend colonoscopy tomorrow.  If I can identify a clear source of bleeding we can observe expectantly given she has had a normal colonoscopy within the last year.  Patient is willing to proceed forward with a stepwise approach.    The risk, benefits, and alternatives were discussed in detail. Risks include bowel perforation, procedure related bleeding event,  infection, inability to safely complete the exam, sedation related complications. The patient, understanding the discussion, consents to proceed forward.        Recommendations:  EGD today.  Possible colonoscopy tomorrow depending on findings from upper endoscopy.  Continue n.p.o. status currently.

## 2024-02-15 NOTE — PROGRESS NOTES
4 Eyes Skin Assessment Completed by Bello RN and Gladys RN.    Head WDL  Ears WDL  Nose WDL  Mouth WDL  Neck WDL  Breast/Chest WDL  Shoulder Blades WDL  Spine WDL  (R) Arm/Elbow/Hand WDL  (L) Arm/Elbow/Hand WDL  Abdomen WDL  Groin WDL  Scrotum/Coccyx/Buttocks WDL  (R) Leg WDL  (L) Leg WDL  (R) Heel/Foot/Toe WDL  (L) Heel/Foot/Toe WDL          Devices In Places Pulse Ox      Interventions In Place N/A    Possible Skin Injury No    Pictures Uploaded Into Epic N/A  Wound Consult Placed N/A  RN Wound Prevention Protocol Ordered No

## 2024-02-15 NOTE — PROGRESS NOTES
[facility] Medicine Daily Progress Note    Date of Service  2/15/2024    Chief Complaint  Sabrina Peters is a 83 y.o. female admitted 2/14/2024 with rectal bleeding    Hospital Course  MsAndrews Peters is a 83 y.o. female with PMHx of atrial fibrillation on anticoagulation, hypertension, hypothyroidism, GERD, has had dark stools/black for the past 2 weeks, no tarry characteristics.  who was [place] [facility] [place]14/[place]24 with rectal bleeding.      Since yesterday she has been having red-colored stools so she went to the ED.  She is on rivaroxaban for atrial fibrillation.  She has a history GERD and takes PPI, says she still has really bad heartburn despite that.  She had an EGD done 6 or more years ago and was told was negative, last colonoscopy was 11/2022 and was told was normal.  She has not been taking any NSAIDs.  Says she drinks 2 ounces of alcohol on the weekend, however last week she drank a lot more than usual.     Prior to transfer vitals were stable, on arrival here hemodynamically stable.  Labs show hemoglobin of 13.  Patient admitted to [facility] medicine for management of care.    During this admission, gastroenterology Dr. Luke was consulted.  Plan for EGD today 2/15, and possible colonoscopy depending on the findings on the upper endoscopy.       Interval Problem Update  -Patient seen and examined prior to EGD procedure.  Discussed with patient plan of care which includes EGD for today and possible colonoscopy tomorrow pending on upper endoscopy results.  Updated hemoglobin 11.8 prior to endoscopy  -Plan of care: Pursue EGD and possible colonoscopy; patient currently n.p.o.; pending recommendations from GI postprocedure  -Disposition: Patient anticipated to stay overnight until further recommendations from GI has been done  -Lab work reviewed: ; Expected  -VSS at this time    I have discussed this patient's plan of care and discharge plan at IDT rounds today with Case Management, Nursing, Nursing  leadership, and other members of the IDT team.    Consultants/Specialty  GI    Code Status  Full Code    Disposition  The patient is not medically cleared for discharge to home or a post-acute facility.  Anticipate discharge to: home with close outpatient follow-up    I have placed the appropriate orders for post-discharge needs.    Review of Systems  Review of Systems   Constitutional:  Positive for malaise/fatigue. Negative for chills and fever.   HENT: Negative.     Eyes: Negative.    Respiratory: Negative.     Cardiovascular: Negative.    Gastrointestinal:  Positive for blood in stool and melena.   Genitourinary: Negative.    Musculoskeletal: Negative.    Skin: Negative.    Neurological:  Positive for weakness.   Endo/Heme/Allergies: Negative.    Psychiatric/Behavioral: Negative.          Physical Exam  Temp:  [36.3 °C (97.3 °F)-36.7 °C (98.1 °F)] 36.7 °C (98.1 °F)  Pulse:  [54-75] 68  Resp:  [13-18] 16  BP: (101-164)/(58-84) 101/58  SpO2:  [93 %-98 %] 95 %    Physical Exam  Vitals and nursing note reviewed.   HENT:      Head: Normocephalic.      Nose: Nose normal.      Mouth/Throat:      Mouth: Mucous membranes are moist.      Pharynx: Oropharynx is clear.   Eyes:      Pupils: Pupils are equal, round, and reactive to light.   Cardiovascular:      Rate and Rhythm: Regular rhythm. Tachycardia present.      Pulses: Normal pulses.      Heart sounds: Normal heart sounds.   Pulmonary:      Effort: Pulmonary effort is normal.      Breath sounds: Normal breath sounds.   Abdominal:      General: Bowel sounds are normal.      Palpations: Abdomen is soft.   Musculoskeletal:         General: Tenderness present.      Cervical back: Normal range of motion and neck supple.   Skin:     General: Skin is dry.      Capillary Refill: Capillary refill takes 2 to 3 seconds.   Neurological:      Mental Status: She is alert. Mental status is at baseline.      Motor: Weakness present.         Fluids  No intake or output data in the 24  hours ending 02/15/24 1438    Laboratory  Recent Labs     02/14/24  1813 02/15/24  0152   WBC 10.0 9.3   RBC 4.13* 3.49*   HEMOGLOBIN 13.4 11.8*   HEMATOCRIT 38.9* 32.0*   MCV 94.2 91.7   MCH 32.4* 33.8*   MCHC 34.4 36.9*   RDW 12.4 41.6   PLATELETCT 297 239   MPV 8.5 9.0     Recent Labs     02/14/24  1813 02/15/24  0152   SODIUM 142 142   POTASSIUM 3.3* 3.5*   CHLORIDE 105 108   CO2 28 22   GLUCOSE 83 95   BUN 16 14   CREATININE 1.0 0.86   CALCIUM 9.4 8.6     Recent Labs     02/14/24 1813   APTT 26.5   INR 0.99               Imaging  No orders to display        Assessment/Plan  * Bright red blood per rectum- (present on admission)  Assessment & Plan  She has had dark/black stools for the past 2 weeks, since today red stools  On rivaroxaban for atrial fibrillation  Has not been taking any NSAIDs.  She has a history of GERD and is already on pantoprazole but says she still has bad symptoms despite that  She drinks 2 ounces of alcohol on the weekend but drank a lot last weekend  Hemodynamically stable, hemoglobin 13 on presentation.  CBC ordered to continue monitoring  N.p.o., GI consulted, Dr. Luke; planned EGD and possible colonoscopy    Essential hypertension, benign- (present on admission)  Assessment & Plan  Continue losartan and metoprolol    Paroxysmal atrial fibrillation (HCC)- (present on admission)  Assessment & Plan  Continue metoprolol.  Hold rivaroxaban due to GI bleed    Gastroesophageal reflux disease without esophagitis- (present on admission)  Assessment & Plan  Says she was scoped 6 years ago or maybe more and was fine.  Continue pantoprazole    Acquired hypothyroidism- (present on admission)  Assessment & Plan  Continue levothyroxine         VTE prophylaxis:    pharmacologic prophylaxis contraindicated due to rectal bleeding      I have performed a physical exam and reviewed and updated ROS and Plan today (2/15/2024). In review of yesterday's note (2/14/2024), there are no changes except as  documented above.    Sharri HERBERT DNP performed a substantiated portion of the service face-to-face with same patient on the same date of service INDEPENDENTLY from the MD on assessment, examination and discussion in plan of care FOR 19 MINUTES.  I was personally involved in reviewing and conducting the medical decision making, including the information as described above.

## 2024-02-15 NOTE — PROGRESS NOTES
[facility]PITAL[facility] TRIAGE OFFICER DIRECT ADMISSION REPORT    Transferring facility: [facility]  Transferring physician: Dr. Arellano  Chief complaint: Melena  Pertinent history & patient course: 82 yo F w/ black stools x 2 weeks. She noticed bright red blood today.  She has a history of atrial fibrillation on Xarelto.  Went to outside facility ER. Hgb 13.4.  Request to be transferred as no GI available at outside facility  Pertinent imaging & lab results: Hemoglobin 13.4, platelet 297, potassium 3.3, BUN 16, creatinine 1  Code Status: Transferring physician confirmed full code  Further work up or recommendations per triage officer prior to transfer: None  Consultants called prior to transfer and pertinent input from consultants: None  Patient accepted for transfer: Yes  Consultants to be called upon arrival: GI in AM   Admission status: Inpatient.   Floor requested: CDU    Please inform the triage officer upon arrival of the patient to [facility] for assignment of a hospitalist to perform admission.      For any question or concerns regarding the care of this patient, please reach out to the assigned hospital

## 2024-02-16 ENCOUNTER — ANESTHESIA (OUTPATIENT)
Dept: SURGERY | Facility: MEDICAL CENTER | Age: 84
End: 2024-02-16
Payer: MEDICARE

## 2024-02-16 ENCOUNTER — ANESTHESIA EVENT (OUTPATIENT)
Dept: SURGERY | Facility: MEDICAL CENTER | Age: 84
End: 2024-02-16
Payer: MEDICARE

## 2024-02-16 VITALS
WEIGHT: 117.06 LBS | OXYGEN SATURATION: 94 % | RESPIRATION RATE: 17 BRPM | HEART RATE: 62 BPM | HEIGHT: 61 IN | TEMPERATURE: 97.9 F | SYSTOLIC BLOOD PRESSURE: 113 MMHG | BODY MASS INDEX: 22.1 KG/M2 | DIASTOLIC BLOOD PRESSURE: 53 MMHG

## 2024-02-16 PROBLEM — K57.30 SIGMOID DIVERTICULOSIS: Status: ACTIVE | Noted: 2024-02-16

## 2024-02-16 PROBLEM — K64.8 INTERNAL HEMORRHOIDS: Status: ACTIVE | Noted: 2024-02-16

## 2024-02-16 LAB
ANION GAP SERPL CALC-SCNC: 11 MMOL/L (ref 7–16)
BUN SERPL-MCNC: 11 MG/DL (ref 8–22)
CALCIUM SERPL-MCNC: 8 MG/DL (ref 8.5–10.5)
CHLORIDE SERPL-SCNC: 110 MMOL/L (ref 96–112)
CO2 SERPL-SCNC: 21 MMOL/L (ref 20–33)
CREAT SERPL-MCNC: 0.75 MG/DL (ref 0.5–1.4)
ERYTHROCYTE [DISTWIDTH] IN BLOOD BY AUTOMATED COUNT: 42.2 FL (ref 35.9–50)
GFR SERPLBLD CREATININE-BSD FMLA CKD-EPI: 80 ML/MIN/1.73 M 2
GLUCOSE SERPL-MCNC: 88 MG/DL (ref 65–99)
HCT VFR BLD AUTO: 31.1 % (ref 37–47)
HGB BLD-MCNC: 10.8 G/DL (ref 12–16)
MCH RBC QN AUTO: 32.6 PG (ref 27–33)
MCHC RBC AUTO-ENTMCNC: 34.7 G/DL (ref 32.2–35.5)
MCV RBC AUTO: 94 FL (ref 81.4–97.8)
PLATELET # BLD AUTO: 207 K/UL (ref 164–446)
PMV BLD AUTO: 8.7 FL (ref 9–12.9)
POTASSIUM SERPL-SCNC: 3.6 MMOL/L (ref 3.6–5.5)
RBC # BLD AUTO: 3.31 M/UL (ref 4.2–5.4)
SODIUM SERPL-SCNC: 142 MMOL/L (ref 135–145)
WBC # BLD AUTO: 8.3 K/UL (ref 4.8–10.8)

## 2024-02-16 PROCEDURE — 80048 BASIC METABOLIC PNL TOTAL CA: CPT

## 2024-02-16 PROCEDURE — A9270 NON-COVERED ITEM OR SERVICE: HCPCS | Performed by: STUDENT IN AN ORGANIZED HEALTH CARE EDUCATION/TRAINING PROGRAM

## 2024-02-16 PROCEDURE — 99239 HOSP IP/OBS DSCHRG MGMT >30: CPT | Performed by: INTERNAL MEDICINE

## 2024-02-16 PROCEDURE — 45378 DIAGNOSTIC COLONOSCOPY: CPT | Performed by: INTERNAL MEDICINE

## 2024-02-16 PROCEDURE — 85027 COMPLETE CBC AUTOMATED: CPT

## 2024-02-16 PROCEDURE — 96376 TX/PRO/DX INJ SAME DRUG ADON: CPT | Mod: XU

## 2024-02-16 PROCEDURE — 700102 HCHG RX REV CODE 250 W/ 637 OVERRIDE(OP): Performed by: STUDENT IN AN ORGANIZED HEALTH CARE EDUCATION/TRAINING PROGRAM

## 2024-02-16 PROCEDURE — 700105 HCHG RX REV CODE 258: Performed by: INTERNAL MEDICINE

## 2024-02-16 PROCEDURE — 160201 HCHG ENDO MINUTES - 1ST 30 MINS LEVEL 2: Performed by: INTERNAL MEDICINE

## 2024-02-16 PROCEDURE — 700111 HCHG RX REV CODE 636 W/ 250 OVERRIDE (IP): Performed by: STUDENT IN AN ORGANIZED HEALTH CARE EDUCATION/TRAINING PROGRAM

## 2024-02-16 PROCEDURE — 700105 HCHG RX REV CODE 258: Performed by: ANESTHESIOLOGY

## 2024-02-16 PROCEDURE — 160048 HCHG OR STATISTICAL LEVEL 1-5: Performed by: INTERNAL MEDICINE

## 2024-02-16 PROCEDURE — 160009 HCHG ANES TIME/MIN: Performed by: INTERNAL MEDICINE

## 2024-02-16 PROCEDURE — 93005 ELECTROCARDIOGRAM TRACING: CPT | Performed by: INTERNAL MEDICINE

## 2024-02-16 PROCEDURE — C9113 INJ PANTOPRAZOLE SODIUM, VIA: HCPCS | Performed by: INTERNAL MEDICINE

## 2024-02-16 PROCEDURE — 700111 HCHG RX REV CODE 636 W/ 250 OVERRIDE (IP): Performed by: INTERNAL MEDICINE

## 2024-02-16 PROCEDURE — G0378 HOSPITAL OBSERVATION PER HR: HCPCS

## 2024-02-16 PROCEDURE — 160035 HCHG PACU - 1ST 60 MINS PHASE I: Performed by: INTERNAL MEDICINE

## 2024-02-16 PROCEDURE — 160002 HCHG RECOVERY MINUTES (STAT): Performed by: INTERNAL MEDICINE

## 2024-02-16 RX ORDER — ONDANSETRON 2 MG/ML
4 INJECTION INTRAMUSCULAR; INTRAVENOUS
Status: DISCONTINUED | OUTPATIENT
Start: 2024-02-16 | End: 2024-02-16 | Stop reason: HOSPADM

## 2024-02-16 RX ORDER — LABETALOL HYDROCHLORIDE 5 MG/ML
5 INJECTION, SOLUTION INTRAVENOUS
Status: DISCONTINUED | OUTPATIENT
Start: 2024-02-16 | End: 2024-02-16 | Stop reason: HOSPADM

## 2024-02-16 RX ORDER — SODIUM CHLORIDE, SODIUM LACTATE, POTASSIUM CHLORIDE, CALCIUM CHLORIDE 600; 310; 30; 20 MG/100ML; MG/100ML; MG/100ML; MG/100ML
INJECTION, SOLUTION INTRAVENOUS CONTINUOUS
Status: DISCONTINUED | OUTPATIENT
Start: 2024-02-16 | End: 2024-02-16 | Stop reason: HOSPADM

## 2024-02-16 RX ORDER — OXYCODONE HCL 5 MG/5 ML
5 SOLUTION, ORAL ORAL
Status: DISCONTINUED | OUTPATIENT
Start: 2024-02-16 | End: 2024-02-16 | Stop reason: HOSPADM

## 2024-02-16 RX ORDER — OXYCODONE HCL 5 MG/5 ML
10 SOLUTION, ORAL ORAL
Status: DISCONTINUED | OUTPATIENT
Start: 2024-02-16 | End: 2024-02-16 | Stop reason: HOSPADM

## 2024-02-16 RX ORDER — HYDROMORPHONE HYDROCHLORIDE 1 MG/ML
0.1 INJECTION, SOLUTION INTRAMUSCULAR; INTRAVENOUS; SUBCUTANEOUS
Status: DISCONTINUED | OUTPATIENT
Start: 2024-02-16 | End: 2024-02-16 | Stop reason: HOSPADM

## 2024-02-16 RX ORDER — PHENYLEPHRINE HCL IN 0.9% NACL 0.5 MG/5ML
SYRINGE (ML) INTRAVENOUS PRN
Status: DISCONTINUED | OUTPATIENT
Start: 2024-02-16 | End: 2024-02-16 | Stop reason: SURG

## 2024-02-16 RX ORDER — HALOPERIDOL 5 MG/ML
1 INJECTION INTRAMUSCULAR
Status: DISCONTINUED | OUTPATIENT
Start: 2024-02-16 | End: 2024-02-16 | Stop reason: HOSPADM

## 2024-02-16 RX ORDER — EPHEDRINE SULFATE 50 MG/ML
5 INJECTION, SOLUTION INTRAVENOUS
Status: DISCONTINUED | OUTPATIENT
Start: 2024-02-16 | End: 2024-02-16 | Stop reason: HOSPADM

## 2024-02-16 RX ORDER — HYDRALAZINE HYDROCHLORIDE 20 MG/ML
5 INJECTION INTRAMUSCULAR; INTRAVENOUS
Status: DISCONTINUED | OUTPATIENT
Start: 2024-02-16 | End: 2024-02-16 | Stop reason: HOSPADM

## 2024-02-16 RX ORDER — HYDROMORPHONE HYDROCHLORIDE 1 MG/ML
0.2 INJECTION, SOLUTION INTRAMUSCULAR; INTRAVENOUS; SUBCUTANEOUS
Status: DISCONTINUED | OUTPATIENT
Start: 2024-02-16 | End: 2024-02-16 | Stop reason: HOSPADM

## 2024-02-16 RX ORDER — LIDOCAINE HYDROCHLORIDE 10 MG/ML
INJECTION, SOLUTION EPIDURAL; INFILTRATION; INTRACAUDAL; PERINEURAL PRN
Status: DISCONTINUED | OUTPATIENT
Start: 2024-02-16 | End: 2024-02-16 | Stop reason: SURG

## 2024-02-16 RX ORDER — BENZOCAINE/MENTHOL 6 MG-10 MG
1 LOZENGE MUCOUS MEMBRANE 2 TIMES DAILY PRN
Qty: 1 EACH | Refills: 1 | Status: SHIPPED | OUTPATIENT
Start: 2024-02-16

## 2024-02-16 RX ORDER — DOCUSATE SODIUM 100 MG/1
100 CAPSULE, LIQUID FILLED ORAL 2 TIMES DAILY
Qty: 60 CAPSULE | Refills: 1 | Status: SHIPPED | OUTPATIENT
Start: 2024-02-16

## 2024-02-16 RX ORDER — HYDROMORPHONE HYDROCHLORIDE 1 MG/ML
0.4 INJECTION, SOLUTION INTRAMUSCULAR; INTRAVENOUS; SUBCUTANEOUS
Status: DISCONTINUED | OUTPATIENT
Start: 2024-02-16 | End: 2024-02-16 | Stop reason: HOSPADM

## 2024-02-16 RX ORDER — DIPHENHYDRAMINE HYDROCHLORIDE 50 MG/ML
12.5 INJECTION INTRAMUSCULAR; INTRAVENOUS
Status: DISCONTINUED | OUTPATIENT
Start: 2024-02-16 | End: 2024-02-16 | Stop reason: HOSPADM

## 2024-02-16 RX ADMIN — PROPOFOL 20 MG: 10 INJECTION, EMULSION INTRAVENOUS at 08:46

## 2024-02-16 RX ADMIN — LOSARTAN POTASSIUM 25 MG: 50 TABLET, FILM COATED ORAL at 05:24

## 2024-02-16 RX ADMIN — PROPOFOL 80 MG: 10 INJECTION, EMULSION INTRAVENOUS at 08:38

## 2024-02-16 RX ADMIN — PROPOFOL 20 MG: 10 INJECTION, EMULSION INTRAVENOUS at 08:49

## 2024-02-16 RX ADMIN — PANTOPRAZOLE SODIUM 40 MG: 40 INJECTION, POWDER, FOR SOLUTION INTRAVENOUS at 05:25

## 2024-02-16 RX ADMIN — SODIUM CHLORIDE, POTASSIUM CHLORIDE, SODIUM LACTATE AND CALCIUM CHLORIDE: 600; 310; 30; 20 INJECTION, SOLUTION INTRAVENOUS at 03:31

## 2024-02-16 RX ADMIN — LIDOCAINE HYDROCHLORIDE 50 MG: 10 INJECTION, SOLUTION EPIDURAL; INFILTRATION; INTRACAUDAL; PERINEURAL at 08:33

## 2024-02-16 RX ADMIN — SODIUM CHLORIDE, POTASSIUM CHLORIDE, SODIUM LACTATE AND CALCIUM CHLORIDE: 600; 310; 30; 20 INJECTION, SOLUTION INTRAVENOUS at 08:07

## 2024-02-16 RX ADMIN — Medication 100 MCG: at 08:38

## 2024-02-16 RX ADMIN — METOPROLOL SUCCINATE 25 MG: 25 TABLET, EXTENDED RELEASE ORAL at 05:24

## 2024-02-16 RX ADMIN — PROPOFOL 20 MG: 10 INJECTION, EMULSION INTRAVENOUS at 08:42

## 2024-02-16 RX ADMIN — LEVOTHYROXINE SODIUM 75 MCG: 0.07 TABLET ORAL at 05:24

## 2024-02-16 ASSESSMENT — PAIN DESCRIPTION - PAIN TYPE
TYPE: SURGICAL PAIN

## 2024-02-16 ASSESSMENT — PATIENT HEALTH QUESTIONNAIRE - PHQ9
SUM OF ALL RESPONSES TO PHQ9 QUESTIONS 1 AND 2: 0
2. FEELING DOWN, DEPRESSED, IRRITABLE, OR HOPELESS: NOT AT ALL
1. LITTLE INTEREST OR PLEASURE IN DOING THINGS: NOT AT ALL

## 2024-02-16 NOTE — OP REPORT
PreOp Diagnosis: Anemia due to GI blood loss, hematochezia, reflux, epigastric pain      PostOp Diagnosis: Normal upper endoscopy      Procedure(s):  GASTROSCOPY - Wound Class: Clean Contaminated    Surgeon(s):  Rasheed Luek M.D.    Anesthesiologist/Type of Anesthesia:  Anesthesiologist: Kaiden Banks M.D./[facility]    Surgical Staff:  Endoscopy Technician: Jovana Terrazas  Endoscopy Nurse: Natasha Tadeo R.N.; Brittanie Castillo R.N.    Specimens removed if any:  * No specimens in log *        CONSENT: The risks, benefits and alternatives of the procedure were discussed in detail. The risks include and are not limited to bleeding, infection, perforation, missed lesions, and sedations risks (cardiopulmonary compromise and allergic reaction to medications).    DESCRIPTION:   The patient presented to the operating room.  A time out was performed prior to beginning the procedure.   The patient was placed in the left lateral position.   Patient was sedated by anesthesia: Propofol.    OPERATIVE FINDINGS:    Esophagus: Normal.  Stomach: Normal.  Duodenum: Normal to the second portion    Blood loss: None    The patient tolerated the procedure well.      There were no immediate complications.    IMPRESSION:  Normal upper endoscopy      RECOMMENDATIONS:  Proceed with colonoscopy tomorrow.  Clear liquid diet.  Bowel preparation this evening.

## 2024-02-16 NOTE — CARE PLAN
The patient is Stable - Low risk of patient condition declining or worsening    Shift Goals  Clinical Goals: pending colonoscpy  Patient Goals: rest; sleep  Family Goals: not present    Progress made toward(s) clinical / shift goals:    Problem: Knowledge Deficit - Standard  Goal: Patient and family/care givers will demonstrate understanding of plan of care, disease process/condition, diagnostic tests and medications  Note: Patient updated regarding plan of care and current treatment. Patient currently on bowel prep for pending colonoscopy in AM. All questions answered. Pt voiced understanding.       Problem: Pain - Standard  Goal: Alleviation of pain or a reduction in pain to the patient’s comfort goal  Outcome: Progressing  Note: Patient denies any pain or discomfort.      Problem: Bowel Elimination  Goal: Establish and maintain regular bowel function  Outcome: Progressing  Note: Patient currently on bowel prep.

## 2024-02-16 NOTE — ANESTHESIA TIME REPORT
Anesthesia Start and Stop Event Times       Date Time Event    2/16/2024 0804 Ready for Procedure     0830 Anesthesia Start     0901 Anesthesia Stop          Responsible Staff  02/16/24      Name Role Begin End    Javier Caraballo D.O. Anesth 0830 0901          Overtime Reason:  no overtime (within assigned shift)    Comments:

## 2024-02-16 NOTE — ANESTHESIA TIME REPORT
Anesthesia Start and Stop Event Times       Date Time Event    2/15/2024 1531 Ready for Procedure     1557 Anesthesia Start     1617 Anesthesia Stop          Responsible Staff  02/15/24      Name Role Begin End    Kaiden Banks M.D. Anesth 1557 1617          Overtime Reason:  no overtime (within assigned shift)    Comments:

## 2024-02-16 NOTE — OP REPORT
PreOp Diagnosis: Hematochezia      PostOp Diagnosis: Sigmoid diverticulosis, internal hemorrhoids      Procedure(s):  COLONOSCOPY - Wound Class: Clean Contaminated    Surgeon(s):  Rasheed Luke M.D.    Anesthesiologist/Type of Anesthesia:  Anesthesiologist: Javier Caraballo D.O./MAC    Surgical Staff:  Circulator: Ahmet Brandon R.N.  Endoscopy Technician: Melony Terrazas  Endoscopy Nurse: Natasha Tadeo R.N.    Specimens removed if any:  * No specimens in log *      CONSENT: The risks, benefits and alternatives of the procedure were discussed in detail. The risks include and are not limited to bleeding, infection, perforation, missed lesions, and sedations risks (cardiopulmonary compromise and allergic reaction to medications).    DESCRIPTION:   The patient presented to the operating room.  A time out was performed prior to beginning the procedure.   The patient was placed in the left lateral recumbent position.   Patient was sedated by anesthesia: Propofol.    OPERATIVE FINDINGS:    Digital rectal examination normal.  Endoscope advanced to the cecum.  [place] prep score:   Right colon: 3; Transverse colon: 3; Left Colon: 3. BPS score: 9  Sigmoid diverticulosis.  Tight sigmoid.  Difficult traversing region.  Remainder of colon normal.  Retroflexion in the rectum and careful inspection of the anal canal demonstrated internal hemorrhoids.  Likely source of recent bleeding.    Blood loss: None    The patient tolerated the procedure well.      There were no immediate complications.    IMPRESSION:  Sigmoid diverticulosis  Internal hemorrhoids    RECOMMENDATIONS:  Anusol/Preparation H as needed  Stool softener as needed  Advance diet  No further GI workup recommended  Discharge home determination to be made by hospitalist  Reconsult GI if needed

## 2024-02-16 NOTE — PROGRESS NOTES
Received report and assumed care of patient. Patient alert and oriented x 4, on RA. Denies pain, discomfort or SOB. Assessment completed. Meds given per MAR. All fall precautions in placed. Bed in lock and lowest position. Patient ambulatory, steady on her feet, ambulates without assistance.Call light and belongings are within reach. Educated on room and call light, patient verbalized understanding. Patient expressed no further needs at this time.

## 2024-02-16 NOTE — OR NURSING
0857 Pt to PACU from OR. Report from anesthesia and OR RN. On 6L O2 via mask. Respirations even and unlabored. VSS.     0916 Patient tolerating sips of water.    0920 Report given to LINWOOD Bill. All questions and concerns addressed at this time.    0949 Patient transported back to T212 on [facility].

## 2024-02-16 NOTE — ANESTHESIA PREPROCEDURE EVALUATION
Case: [id number] Date/Time: 02/16/24 0820    Procedure: COLONOSCOPY    Location: [facility] ROOM 26 / SURGERY SAME DAY [facility]    Surgeons: Rasheed Luke M.D.            Relevant Problems   CARDIAC   (positive) Essential hypertension, benign   (positive) Paroxysmal atrial fibrillation (HCC)      GI   (positive) Gastroesophageal reflux disease without esophagitis      ENDO   (positive) Acquired hypothyroidism       Physical Exam    Airway   Mallampati: II  TM distance: >3 FB  Neck ROM: full       Cardiovascular - normal exam  Rhythm: regular  Rate: normal  (-) murmur     Dental - normal exam           Pulmonary - normal exam  Breath sounds clear to auscultation     Abdominal    Neurological - normal exam                   Anesthesia Plan    ASA 3   ASA physical status 3 criteria: a thrombophilic disease requiring anticoagulation    Plan - MAC               Induction: intravenous    Postoperative Plan: Postoperative administration of opioids is intended.    Pertinent diagnostic labs and testing reviewed    Informed Consent:    Anesthetic plan and risks discussed with patient.    Use of blood products discussed with: patient whom consented to blood products.

## 2024-02-16 NOTE — PROGRESS NOTES
Pt back from colonoscopy. Ambulated from [place] to bed without assistance. Updated on POC. Dr Thakur notified

## 2024-02-16 NOTE — DISCHARGE INSTRUCTIONS
Gastrointestinal Bleeding  Gastrointestinal (GI) bleeding is bleeding anywhere along the digestive tract. The digestive tract carries food from your mouth until it leaves your body as waste, or poop. You may have blood in your poop or have black poop. If you vomit, there may be blood in it too.  This condition can be mild, serious, or even life-threatening. If you have a lot of bleeding, you may need to stay in the hospital.  What are the causes?  This condition may be caused by:  Irritation and swelling of the esophagus (esophagitis). The esophagus is part of the body that moves food from your mouth to your stomach.  Swollen veins in the butt (hemorrhoids).  Tears in the opening of the butt. These are often caused by passing hard poop.  Pouches that form on the colon (diverticulosis).  Irritation and swelling of pouches that have formed in your colon (diverticulitis).  Growths (polyps) or cancer.  Irritation of the stomach lining (gastritis).  Sores (ulcers) in the stomach.  What increases the risk?  You are more likely to develop this condition if:  You have a certain type of infection in your stomach called Helicobacter pylori infection.  You take certain medicines.  You smoke.  You drink alcohol.  What are the signs or symptoms?  Common symptoms of this condition include:  Vomiting material that has bright red blood in it. It may look like coffee grounds.  Changes in your poop. The poop:  May have red blood in it.  May be black, look like tar, and smell stronger than normal.  May be red.  Pain or cramping in the belly (abdomen).  How is this treated?  Treatment for this condition depends on the cause of the bleeding. For example:  Your doctor may treat the bleeding during diagnosis. Common ways of diagnosing this condition is endoscopy or colonoscopy.  Medicines can be used to:  Help control irritation, swelling, or infection.  Reduce acid in your stomach.  Certain problems can be treated  with:  Creams.  Medicines that are put in the butt (suppositories).  Warm baths.  Surgery is sometimes needed.  If you lose a lot of blood, you may need a blood transfusion.  If there is a lot of bleeding, you will need to stay in the hospital. If bleeding is mild, you may be allowed to go home.  Follow these instructions at home:    Take over-the-counter and prescription medicines only as told by your doctor.  Eat foods that have a lot of fiber in them. These foods include beans, whole grains, and fresh fruits and vegetables. You can also try eating 1-3 prunes each day.  Drink enough fluid to keep your pee (urine) pale yellow.  Keep all follow-up visits.  Contact a doctor if:  Your symptoms do not get better with treatment.  Get help right away if:  Your bleeding does not stop.  You feel dizzy or you pass out (faint).  You feel weak.  You have very bad cramps in your back or belly.  You pass large clumps of blood (clots) in your poop.  Your symptoms are getting worse.  You have chest pain or fast heartbeats.  These symptoms may be an emergency. Get help right away. Call 911.  Do not wait to see if the symptoms will go away.  Do not drive yourself to the hospital.  Summary  Gastrointestinal (GI) bleeding is bleeding anywhere along the digestive tract. The digestive tract carries food from your mouth until it leaves your body as waste, or poop.  This bleeding can be caused by many things. Treatment depends on the cause of the bleeding.  Take medicines only as told by your doctor.  Keep all follow-up visits.  This information is not intended to replace advice given to you by your health care provider. Make sure you discuss any questions you have with your health care provider.  Document Revised: 07/22/2022 Document Reviewed: 07/22/2022  Elsevier Patient Education © 2023 [employer]

## 2024-02-16 NOTE — DISCHARGE SUMMARY
Discharge Summary    CHIEF COMPLAINT ON ADMISSION  No chief complaint on file.      Reason for Admission  GIB with Colitis     Admission Date  2/14/2024    CODE STATUS  Full Code    HPI & HOSPITAL COURSE  Sabrina Peters is a 83 y.o. female with atrial fibrillation on anticoagulation, hypertension, hypothyroidism, GERD on PPI, who was transferred from [facility] [place]14/2024 with rectal bleeding.  She has had dark/black stools for the past 2 weeks, without tarry characteristics but since the day prior to admission she has been having red-colored stools.  She is on rivaroxaban for atrial fibrillation.  Her last EGD was done 6 years ago and was told was negative, and had a normal colonoscopy on 11/2022.  She denies taking NSAIDs, but admits to drinking 2 ounces of alcohol on the weekends but did drink a lot more than usual last week.  On evaluation, vital signs were stable.  Hemoglobin was 13.  INR was normal.  Patient was started on IV fluids and PPI.  GI was consulted who recommended EGD which came back normal.  Colonoscopy was then done which only showed sigmoid diverticulosis with internal hemorrhoids.  She clinically improved.  She had no further rectal bleeding.  Her hemoglobin remained stable.  She remained hemodynamically stable and afebrile.  GI has cleared her for discharge as no further GI workup was recommended.    I have personally seen and examined the patient on the day of discharge. With her clinical improvement, she was deemed ready to discharge from the hospital as she did not have any further hospitalization needs. Patient felt comfortable going home. The discharge plan was discussed with the patient, with which she was agreeable to.     Therefore, she is discharged in good and stable condition to home with close outpatient follow-up.      Discharge Date  2/16/2024      FOLLOW UP ITEMS POST DISCHARGE  -She will continue on Colace for stool softening.  She will also use Preparation H as needed for hemorrhoid  flare/rectal bleed.  -Follow-up with PCP.  - counseled to seek immediate medical attention, or return to the ED for recurrent or worsening symptoms.      DISCHARGE DIAGNOSES  Principal Problem:    Bright red blood per rectum (POA: Yes)  Active Problems:    Sigmoid diverticulosis (POA: Yes)    Internal hemorrhoids (POA: Yes)    Hemorrhagic disorder due to extrinsic circulating anticoagulants (HCC) (POA: Yes)    Acquired hypothyroidism (POA: Yes)    Gastroesophageal reflux disease without esophagitis (POA: Yes)    Paroxysmal atrial fibrillation (HCC) (POA: Yes)    Essential hypertension, benign (POA: Yes)  Resolved Problems:    * No resolved hospital problems. *      FOLLOW UP  Future Appointments   Date Time Provider Department Center   5/1/2024  1:00 PM Bradly Ac M.D. [facility] None     No follow-up provider specified.    MEDICATIONS ON DISCHARGE     Medication List        START taking these medications        Instructions   docusate sodium 100 MG Caps  Commonly known as: Colace   Take 1 Capsule by mouth 2 times a day.  Dose: 100 mg     hydrocortisone 1 % Crea   Apply 1 Application topically 2 times a day as needed (hemorrhoids, rectal bleed).  Dose: 1 Application            CHANGE how you take these medications        Instructions   alendronate 70 MG Tabs  What changed: when to take this  Commonly known as: Fosamax   TAKE 1 TABLET BY MOUTH ONE TIME PER WEEK     atorvastatin 10 MG Tabs  What changed:   when to take this  additional instructions  Commonly known as: Lipitor   TAKE 1/2 TABLET BY MOUTH EVERY DAY     levothyroxine 75 MCG Tabs  What changed:   how much to take  how to take this  when to take this  Commonly known as: Synthroid   Take one tab daily except Sunday     valACYclovir 500 MG Tabs  What changed: See the new instructions.  Commonly known as: Valtrex   TAKE 1 TABLET BY MOUTH TWICE A DAY FOR 3 DAYS FOR RECURRENCE            CONTINUE taking these medications        Instructions   Fish Oil 1200 MG  Cpdr   Take 1,200 mg by mouth every evening.  Dose: 1,200 mg     losartan 25 MG Tabs  Commonly known as: Cozaar   Take 1 Tablet by mouth 2 times a day.  Dose: 25 mg     metoprolol SR 25 MG Tb24  Commonly known as: Toprol XL   TAKE 1 TABLET BY MOUTH TWICE A DAY  Dose: 25 mg     pantoprazole 20 MG tablet  Commonly known as: Protonix   Take 1 Tablet by mouth every day.  Dose: 20 mg     rivaroxaban 20 MG Tabs tablet  Commonly known as: Xarelto   Take 1 Tablet by mouth with dinner.  Dose: 20 mg     Vitamin C 1000 MG Tabs   Take 1,000 mg by mouth every evening.  Dose: 1,000 mg     WOMENS 50+ MULTI VITAMIN/MIN PO   Take 1 Tablet by mouth every evening.  Dose: 1 Tablet              Allergies  Allergies   Allergen Reactions    Codeine Nausea       DIET  No orders of the defined types were placed in this encounter.      ACTIVITY  As tolerated.  Weight bearing as tolerated    CONSULTATIONS  GI    PROCEDURES  none    LABORATORY  Lab Results   Component Value Date    SODIUM 142 02/16/2024    POTASSIUM 3.6 02/16/2024    CHLORIDE 110 02/16/2024    CO2 21 02/16/2024    GLUCOSE 88 02/16/2024    BUN 11 02/16/2024    CREATININE 0.75 02/16/2024        Lab Results   Component Value Date    WBC 8.3 02/16/2024    HEMOGLOBIN 10.8 (L) 02/16/2024    HEMATOCRIT 31.1 (L) 02/16/2024    PLATELETCT 207 02/16/2024        Total time of the discharge process = 45 minutes.

## 2024-02-16 NOTE — ANESTHESIA POSTPROCEDURE EVALUATION
Patient: Sabrina Peters    Procedure Summary       Date: 02/16/24 Room / Location: [facility] ROOM 26 / SURGERY SAME DAY [facility]    Anesthesia Start: 0830 Anesthesia Stop: 0901    Procedure: COLONOSCOPY (Anus) Diagnosis: (DIVERTICULOSIS AND HEMORRHOIDS)    Surgeons: Rasheed Luke M.D. Responsible Provider: Javier Caraballo D.O.    Anesthesia Type: MAC ASA Status: 3            Final Anesthesia Type: MAC  Last vitals  BP   Blood Pressure : 130/59    Temp   36.8 °C (98.2 °F)    Pulse   68   Resp   18    SpO2   96 %      Anesthesia Post Evaluation    Patient location during evaluation: PACU  Patient participation: complete - patient participated  Level of consciousness: awake and alert    Airway patency: patent  Anesthetic complications: no  Cardiovascular status: hemodynamically stable  Respiratory status: acceptable  Hydration status: euvolemic    PONV: none          No notable events documented.     Nurse Pain Score: 0 (NPRS)

## 2024-02-16 NOTE — ANESTHESIA POSTPROCEDURE EVALUATION
Patient: Sabrina Peters    Procedure Summary       Date: 02/15/24 Room / Location: [facility] ROOM 26 / SURGERY SAME DAY [facility]    Anesthesia Start: 1557 Anesthesia Stop: 1617    Procedure: GASTROSCOPY (Esophagus) Diagnosis: (normal EGD)    Surgeons: Rasheed Luke M.D. Responsible Provider: Kaiden Banks M.D.    Anesthesia Type: general ASA Status: 2            Final Anesthesia Type: general  Last vitals  BP   Blood Pressure : 120/59    Temp   36.8 °C (98.2 °F)    Pulse   66   Resp   18    SpO2   99 %      Anesthesia Post Evaluation    Patient location during evaluation: PACU  Patient participation: complete - patient participated  Level of consciousness: awake and alert  Pain score: 2    Airway patency: patent  Anesthetic complications: no  Cardiovascular status: hemodynamically stable  Respiratory status: acceptable  Hydration status: euvolemic    PONV: none          There were no known notable events for this encounter.     Nurse Pain Score: 0 (NPRS)           Atrial Fibrillation/Atrial Fibrillation

## 2024-02-16 NOTE — OR NURSING
1613: Pt arrived from OR to PACU 3. Connected to monitor. Report received from anesthesia & RN. VSS. Oxygen at 8L via mask. Breaths calm, even, and unlabored.  No signs of pain.     1624: Pt sat up in bed, weaned to room air, and given sips of water. Tolerating well. Pt denies pain or nausea at this time.     1628: Report given to LINWOOD Dillon.     1634: Pt transported back to room with RN assistance. Pt did not have any belongings in PACU.

## 2024-02-16 NOTE — OR NURSING
Introduced self to patient and discussed plan of care. Surgical consent signed. IV patent. Checked oxygen tank on gurney, placed full tank on gurney. Pre op complete.

## 2024-02-17 LAB — EKG IMPRESSION: NORMAL

## 2024-02-17 PROCEDURE — 93010 ELECTROCARDIOGRAM REPORT: CPT | Performed by: STUDENT IN AN ORGANIZED HEALTH CARE EDUCATION/TRAINING PROGRAM

## 2024-10-18 ENCOUNTER — APPOINTMENT (RX ONLY)
Dept: URBAN - METROPOLITAN AREA CLINIC 31 | Facility: CLINIC | Age: 84
Setting detail: DERMATOLOGY
End: 2024-10-18

## 2024-10-18 DIAGNOSIS — L81.4 OTHER MELANIN HYPERPIGMENTATION: ICD-10-CM

## 2024-10-18 DIAGNOSIS — L82.1 OTHER SEBORRHEIC KERATOSIS: ICD-10-CM

## 2024-10-18 DIAGNOSIS — R20.2 PARESTHESIA OF SKIN: ICD-10-CM

## 2024-10-18 DIAGNOSIS — L57.0 ACTINIC KERATOSIS: ICD-10-CM

## 2024-10-18 DIAGNOSIS — Z71.89 OTHER SPECIFIED COUNSELING: ICD-10-CM

## 2024-10-18 DIAGNOSIS — D22 MELANOCYTIC NEVI: ICD-10-CM

## 2024-10-18 DIAGNOSIS — D18.0 HEMANGIOMA: ICD-10-CM

## 2024-10-18 PROBLEM — D22.5 MELANOCYTIC NEVI OF TRUNK: Status: ACTIVE | Noted: 2024-10-18

## 2024-10-18 PROBLEM — D18.01 HEMANGIOMA OF SKIN AND SUBCUTANEOUS TISSUE: Status: ACTIVE | Noted: 2024-10-18

## 2024-10-18 PROCEDURE — 99213 OFFICE O/P EST LOW 20 MIN: CPT | Mod: 25

## 2024-10-18 PROCEDURE — ? LIQUID NITROGEN

## 2024-10-18 PROCEDURE — 17000 DESTRUCT PREMALG LESION: CPT

## 2024-10-18 PROCEDURE — ? COUNSELING

## 2024-10-18 ASSESSMENT — LOCATION SIMPLE DESCRIPTION DERM
LOCATION SIMPLE: RIGHT UPPER BACK
LOCATION SIMPLE: LEFT UPPER BACK
LOCATION SIMPLE: RIGHT LOWER BACK
LOCATION SIMPLE: RIGHT FOREARM

## 2024-10-18 ASSESSMENT — LOCATION DETAILED DESCRIPTION DERM
LOCATION DETAILED: RIGHT DISTAL DORSAL FOREARM
LOCATION DETAILED: LEFT MID-UPPER BACK
LOCATION DETAILED: RIGHT SUPERIOR LATERAL MIDBACK
LOCATION DETAILED: RIGHT INFERIOR UPPER BACK
LOCATION DETAILED: RIGHT SUPERIOR UPPER BACK

## 2024-10-18 ASSESSMENT — LOCATION ZONE DERM
LOCATION ZONE: ARM
LOCATION ZONE: TRUNK
